# Patient Record
Sex: MALE | Race: WHITE | NOT HISPANIC OR LATINO | Employment: OTHER | URBAN - METROPOLITAN AREA
[De-identification: names, ages, dates, MRNs, and addresses within clinical notes are randomized per-mention and may not be internally consistent; named-entity substitution may affect disease eponyms.]

---

## 2017-04-19 ENCOUNTER — TELEPHONE (OUTPATIENT)
Dept: OTOLARYNGOLOGY | Facility: CLINIC | Age: 82
End: 2017-04-19

## 2017-04-19 NOTE — TELEPHONE ENCOUNTER
Left a message. Will give Marla this message so that she may call the patient to schedule Cochlear Implant eval.

## 2017-04-20 ENCOUNTER — TELEPHONE (OUTPATIENT)
Dept: OTOLARYNGOLOGY | Facility: CLINIC | Age: 82
End: 2017-04-20

## 2018-04-19 DIAGNOSIS — I71.40 ABDOMINAL AORTIC ANEURYSM (AAA) WITHOUT RUPTURE: Primary | ICD-10-CM

## 2018-04-24 ENCOUNTER — OFFICE VISIT (OUTPATIENT)
Dept: CARDIOLOGY | Facility: CLINIC | Age: 83
End: 2018-04-24
Payer: MEDICARE

## 2018-04-24 VITALS
HEART RATE: 79 BPM | WEIGHT: 167.13 LBS | OXYGEN SATURATION: 96 % | HEIGHT: 66 IN | DIASTOLIC BLOOD PRESSURE: 53 MMHG | BODY MASS INDEX: 26.86 KG/M2 | SYSTOLIC BLOOD PRESSURE: 112 MMHG

## 2018-04-24 DIAGNOSIS — I73.9 PAD (PERIPHERAL ARTERY DISEASE): ICD-10-CM

## 2018-04-24 DIAGNOSIS — I25.10 CORONARY ARTERY DISEASE, ANGINA PRESENCE UNSPECIFIED, UNSPECIFIED VESSEL OR LESION TYPE, UNSPECIFIED WHETHER NATIVE OR TRANSPLANTED HEART: ICD-10-CM

## 2018-04-24 DIAGNOSIS — E11.8 TYPE 2 DIABETES MELLITUS WITH COMPLICATION, WITHOUT LONG-TERM CURRENT USE OF INSULIN: ICD-10-CM

## 2018-04-24 DIAGNOSIS — I71.40 AAA (ABDOMINAL AORTIC ANEURYSM) WITHOUT RUPTURE: Primary | ICD-10-CM

## 2018-04-24 PROBLEM — E11.9 DM (DIABETES MELLITUS): Status: ACTIVE | Noted: 2018-04-24

## 2018-04-24 PROCEDURE — 99215 OFFICE O/P EST HI 40 MIN: CPT | Mod: S$GLB,,, | Performed by: INTERNAL MEDICINE

## 2018-04-24 PROCEDURE — 99999 PR PBB SHADOW E&M-EST. PATIENT-LVL IV: CPT | Mod: PBBFAC,,, | Performed by: INTERNAL MEDICINE

## 2018-04-24 RX ORDER — GABAPENTIN 100 MG/1
CAPSULE ORAL
COMMUNITY
Start: 2018-03-01 | End: 2018-06-26

## 2018-04-24 RX ORDER — DIPHENHYDRAMINE HCL 25 MG
50 CAPSULE ORAL ONCE
Status: CANCELLED | OUTPATIENT
Start: 2018-04-24 | End: 2018-04-24

## 2018-04-24 RX ORDER — SODIUM CHLORIDE 9 MG/ML
INJECTION, SOLUTION INTRAVENOUS CONTINUOUS
Status: CANCELLED | OUTPATIENT
Start: 2018-04-24

## 2018-04-24 RX ORDER — ROSUVASTATIN CALCIUM 40 MG/1
TABLET, COATED ORAL
COMMUNITY
Start: 2018-03-19

## 2018-04-24 RX ORDER — LOSARTAN POTASSIUM 25 MG/1
TABLET ORAL
COMMUNITY
Start: 2018-04-11

## 2018-04-24 RX ORDER — VITAMIN E MIXED 400 UNIT
CAPSULE ORAL
COMMUNITY
End: 2018-06-26

## 2018-04-24 RX ORDER — METOPROLOL TARTRATE 25 MG/1
TABLET, FILM COATED ORAL
COMMUNITY
End: 2018-06-26 | Stop reason: SDUPTHER

## 2018-04-24 RX ORDER — METOPROLOL SUCCINATE 25 MG/1
TABLET, EXTENDED RELEASE ORAL
COMMUNITY
Start: 2018-03-19

## 2018-04-24 RX ORDER — GLUCOSAM/CHONDRO/HERB 149/HYAL 750-100 MG
TABLET ORAL
COMMUNITY

## 2018-04-24 RX ORDER — TAMSULOSIN HYDROCHLORIDE 0.4 MG/1
CAPSULE ORAL
COMMUNITY
End: 2018-06-26 | Stop reason: SDUPTHER

## 2018-04-24 RX ORDER — NITROGLYCERIN 0.4 MG/1
TABLET SUBLINGUAL
COMMUNITY
Start: 2018-03-03

## 2018-04-24 RX ORDER — CITALOPRAM 40 MG/1
TABLET, FILM COATED ORAL
COMMUNITY
Start: 2018-03-01

## 2018-04-24 RX ORDER — LORAZEPAM 1 MG/1
TABLET ORAL
COMMUNITY
End: 2018-06-26

## 2018-04-24 RX ORDER — TAMSULOSIN HYDROCHLORIDE 0.4 MG/1
CAPSULE ORAL
COMMUNITY
Start: 2018-02-23

## 2018-04-24 RX ORDER — ACETAMINOPHEN 500 MG
TABLET ORAL
COMMUNITY

## 2018-04-24 RX ORDER — ASPIRIN 325 MG
TABLET ORAL
Status: ON HOLD | COMMUNITY
End: 2018-05-21 | Stop reason: HOSPADM

## 2018-04-24 NOTE — ASSESSMENT & PLAN NOTE
SASHA 6/2017 - R 0.53 and L 0.48, monophasic b/l  No CLI sxs. 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 No rest pain.

## 2018-04-24 NOTE — PROGRESS NOTES
"Subjective:    Patient ID:  Nas Valencia is a 87 y.o. male who presents for evaluation of Aortic Aneurysm      Referring Doctor: Dr. oJna PEDERSEN     This is an 88 y/o M w/ PMHx sig for [place], CAD s/p 5V CABG 2005, PAD and chronic infra-renal AAA s/p failed attempt at repair in EJ 2014 and DM. Referred to us for EVAR.    Pt has known of his AAA since 2005 and it has been monitored by his PCP. It was discovered during his CABG procedure.     AAA 4/2017 - 5.8 cm infra renal AAA  EVAR attempt at  in 05/2014 aborted due to severely calcified femoral arteries   AAA 01/31/2018 - 6.2 x 7.2 cm infra renal AAA  CTA Abd/Pelvis 2/6/2018 - 6.3 x 6.3 x 7.1 cm  SASHA 6/2017 - R 0.53 and L 0.48, monophasic b/l  TTE 5/3/2017 - EF 55-65%, mild LVH, DD1, mild LAE    Pt is compliant with his BP meds. He denies having any abdominal pain sxs. He denies any anginal sxs. No CLI sxs. 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 No rest pain. Pt has been a life long smoker and quit 17 yrs ago.    ROS   Constitution: negative for - fever, chills, weight loss or weight gain  HENT: negative for - sore throat, rhinorrhea, or headache  Eyes: negative for - blurred or double vision  Cardiovascular: see above  Pulmonary: see above  Gastrointestinal: negative for - abdominal pain, nausea, vomiting, or diarrhea  : negative for - dysuria  Neurological: negative for - focal weakness or sensory changes       Objective:    Physical Exam   BP (!) 112/53 (BP Location: Right arm, Patient Position: Sitting, BP Method: Large (Automatic))   Pulse 79   Ht 5' 6" (1.676 m)   Wt 75.8 kg (167 lb 1.7 oz)   SpO2 96%   BMI 26.97 kg/m²      Constitutional: No apparent distress, conversant  HEENT: Sclera anicteric, extraocular movements intact  Neck: No jugular venous distension, R Carotid Bruit  Cardiac: Regular rate and rhythm, no JVD,  no murmurs rubs or gallops, normal S1/S2, no LE edema  Pulm: Clear to auscultation " bilaterally, no wheezes, rales, or ronchi  GI: Abdomen soft, nontender, nondistended  Skin: No ecchymosis, erythema, or ulcers  Psych: Alert and oriented to person place location, appropriate affect  Neuro: No focal deficits  Vascular:   RIGHT LEFT   RADIAL 2+ 2+   ULNAR 2+ 2+   FEMORAL 1+ 1+   DP Non-palpable Non-palpable   TP Non-palpable Non-palpable   TWYLA'S TEST Normal Normal   BARBEAU TEST           OUTSIDE STUDIES:    US AAA 4/2017 - 5.8 cm infra renal AAA    EVAR attempt at  in 05/2014 aborted due to severely calcified femoral arteries     AAA 01/31/2018 - 6.2 x 7.2 cm infra renal AAA    CTA Abd/Pelvis - 6.3 x 6.3 x 7.1 cm    SASHA 6/2017 - R 0.53 and L 0.48, monophasic b/l    TTE 5/3/2017 - EF 55-65%, mild LVH, DD1, mild LAE      Assessment:       1. AAA (abdominal aortic aneurysm) without rupture    2. Coronary artery disease, angina presence unspecified, unspecified vessel or lesion type, unspecified whether native or transplanted heart    3. PAD (peripheral artery disease)    4. Type 2 diabetes mellitus with complication, without long-term current use of insulin         Plan:           AAA (abdominal aortic aneurysm) without rupture  CTA Abd/Pelvis 2/6/2018 - Infra-renal AAA measuring 6.3 x 6.3 x 7.1 cm. Meets indication for repair  Will send his CTA to [facility] for gating and graft assessment. If they approve, then we will first proceed with PTA of his Iliac and femoral arterial system in prep for EVAR (his ilio-fem system has multiple discrete calcified stenoses which currently preclude endograft deployment). If we are able to adequately dilate, will then proceed with EVAR in a staged fashion    CAD (coronary artery disease)  CAD s/p 5V CABG 2005  Angina free    PAD (peripheral artery disease)  SASHA 6/2017 - R 0.53 and L 0.48, monophasic b/l  No CLI sxs. 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 No rest pain.    DM (diabetes mellitus)  Diet controlled    Above plan d/w Dr. Hernandez  who is in agreement.    Please page/call if any questions or concerns.     Pavan Alcazar MD  Interventional Cardiology Fellow  Pager: [phone]      I have personally taken the history and examined this patient. I have discussed and agree with the resident's findings and plan as documented in the resident's note.  Will evaluate patient for Ovation Graft. Known severe bilateral iliac disease would require prior angioplasty. If not candidate for PEVAR, will refer to vascular surgery.  Dave Hernandez

## 2018-04-24 NOTE — ASSESSMENT & PLAN NOTE
CTA Abd/Pelvis 2/6/2018 - Infra-renal AAA measuring 6.3 x 6.3 x 7.1 cm. Meets indication for repair  Will send his CTA to [facility] for gating and graft assessment. If they approve, then we will first proceed with PTA of his Iliac and femoral arterial system in prep for EVAR (his ilio-fem system has multiple discrete calcified stenoses which currently preclude endograft deployment). If we are able to adequately dilate, will then proceed with EVAR in a staged fashion

## 2018-04-24 NOTE — LETTER
April 24, 2018      Jona Carlos MD  [address]  [address]  [place] [address]           [facility]  [address]  [facility] [address]  Phone: [phone]          Patient: Nas Valencia   MR Number: [id number]   YOB: 1930   Date of Visit: 4/24/2018       Dear Dr. Jona Carlos:    Thank you for referring Nas Valencia to me for evaluation. Attached you will find relevant portions of my assessment and plan of care.    If you have questions, please do not hesitate to call me. I look forward to following Nas Valencia along with you.    Sincerely,    Dave Hernandez MD    Enclosure  CC:  No Recipients    If you would like to receive this communication electronically, please contact [email or web address] or [phone] to request more information on [employer] access.    For providers and/or their staff who would like to refer a patient to [facility], please contact us through our one-stop-shop provider referral line, [facility], at [phone].    If you feel you have received this communication in error or would no longer like to receive these types of communications, please e-mail [email or web address]

## 2018-05-14 ENCOUNTER — DOCUMENTATION ONLY (OUTPATIENT)
Dept: CARDIOLOGY | Facility: CLINIC | Age: 83
End: 2018-05-14

## 2018-05-14 NOTE — PROGRESS NOTES
OUTPATIENT CATHETERIZATION INSTRUCTIONS    You have been scheduled for a procedure in the catheterization lab on Monday, May 21,  2018.     Please report to the Cardiology Waiting Area on the Third floor of the hospital and check in at 10 AM.   You will then be taken to the SSCU (Short Stay Cardiac Unit) and prepared for your procedure. Please be aware that this is not the time of your procedure but the time you are to arrive. The procedures are scheduled on an hourly basis; however, emergency cases take precedence over all other cases.       You may not have anything to eat or drink after midnight the night before your test. You may take your regular morning medications with water. If there are any medications that you should not take you will be instructed to hold them that morning. If you are diabetic and on Metformin (Glucophage) do not take it the day before, the day of, and for 2 days after your procedure.      The procedure will take 1-2 hours to perform. After the procedure, you will return to SSCU on the third floor of the hospital. You will need to lie still (or keep your arm still) for the next 4 to 6 hours to minimize bleeding from the puncture site. Your family may remain in the room with you during this time.       You may be able to be discharged home that same afternoon if there is someone to drive you home and there were no complications. If you have one of the balloon, stent, or device procedures you may spend the night in the hospital. Your doctor will determine, based on your progress, the date and time of your discharge. The results of your procedure will be discussed with you before you are discharged. Any further testing or procedures will be scheduled for you either before you leave or you will be called with these appointments.       If you should have any questions, concerns, or need to change the date of your procedure, please call  JAXSON Doty @ [phone]    Special  Instructions:  none        THE ABOVE INSTRUCTIONS WERE GIVEN TO THE PATIENT VERBALLY AND THEY VERBALIZED UNDERSTANDING.  THEY DO NOT REQUIRE ANY SPECIAL NEEDS AND DO NOT HAVE ANY LEARNING BARRIERS.          Directions for Reporting to Cardiology Waiting Area in the Hospital  If you park in the Parking Garage:  Take elevators to the1st floor of the parking garage.  Continue past the gift shop, coffee shop, and piano.  Take a right and go to the gold elevators. (Elevator B)  Take the elevator to the 3rd floor.  Follow the arrow on the sign on the wall that says Cath Lab Registration/EP Lab Registration.  Follow the long hallway all the way around until you come to a big open area.  This is the registration area.  Check in at Reception Desk.    OR    If family is dropping you off:  Have them drop you off at the front of the Hospital under the green overhang.  Enter through the doors and take a right.  Take the E elevators to the 3rd floor Cardiology Waiting Area.  Check in at the Reception Desk in the waiting room.

## 2018-05-21 ENCOUNTER — HOSPITAL ENCOUNTER (INPATIENT)
Facility: HOSPITAL | Age: 83
LOS: 1 days | Discharge: HOME | DRG: 269 | End: 2018-05-22
Attending: INTERNAL MEDICINE | Admitting: INTERNAL MEDICINE
Payer: MEDICARE

## 2018-05-21 DIAGNOSIS — I71.40 AAA (ABDOMINAL AORTIC ANEURYSM) WITHOUT RUPTURE: ICD-10-CM

## 2018-05-21 DIAGNOSIS — I25.10 ATHEROSCLEROTIC HEART DISEASE OF NATIVE CORONARY ARTERY WITHOUT ANGINA PECTORIS: ICD-10-CM

## 2018-05-21 LAB
ABO + RH BLD: NORMAL
ANION GAP SERPL CALC-SCNC: 8 MMOL/L
BASOPHILS # BLD AUTO: 0.02 K/UL
BASOPHILS NFR BLD: 0.4 %
BLD GP AB SCN CELLS X3 SERPL QL: NORMAL
BUN SERPL-MCNC: 18 MG/DL
CALCIUM SERPL-MCNC: 10.4 MG/DL
CHLORIDE SERPL-SCNC: 101 MMOL/L
CO2 SERPL-SCNC: 27 MMOL/L
CREAT SERPL-MCNC: 1 MG/DL
DIFFERENTIAL METHOD: ABNORMAL
EOSINOPHIL # BLD AUTO: 0.2 K/UL
EOSINOPHIL NFR BLD: 4.6 %
ERYTHROCYTE [DISTWIDTH] IN BLOOD BY AUTOMATED COUNT: 13.7 %
EST. GFR  (AFRICAN AMERICAN): >60 ML/MIN/1.73 M^2
EST. GFR  (NON AFRICAN AMERICAN): >60 ML/MIN/1.73 M^2
GLUCOSE SERPL-MCNC: 115 MG/DL
HCT VFR BLD AUTO: 36.7 %
HGB BLD-MCNC: 12.2 G/DL
IMM GRANULOCYTES # BLD AUTO: 0.01 K/UL
IMM GRANULOCYTES NFR BLD AUTO: 0.2 %
LYMPHOCYTES # BLD AUTO: 1.3 K/UL
LYMPHOCYTES NFR BLD: 25.2 %
MCH RBC QN AUTO: 31.1 PG
MCHC RBC AUTO-ENTMCNC: 33.2 G/DL
MCV RBC AUTO: 94 FL
MONOCYTES # BLD AUTO: 0.6 K/UL
MONOCYTES NFR BLD: 11.5 %
NEUTROPHILS # BLD AUTO: 3 K/UL
NEUTROPHILS NFR BLD: 58.1 %
NRBC BLD-RTO: 0 /100 WBC
PERIPHERAL PTA: YES
PERIPHERAL STENOSIS: ABNORMAL
PERIPHERAL STENT: YES
PLATELET # BLD AUTO: 144 K/UL
PMV BLD AUTO: 10.8 FL
POC ACTIVATED CLOTTING TIME K: 224 SEC (ref 74–137)
POCT GLUCOSE: 109 MG/DL (ref 70–110)
POTASSIUM SERPL-SCNC: 4.2 MMOL/L
RBC # BLD AUTO: 3.92 M/UL
SAMPLE: ABNORMAL
SODIUM SERPL-SCNC: 136 MMOL/L
WBC # BLD AUTO: 5.23 K/UL

## 2018-05-21 PROCEDURE — 93005 ELECTROCARDIOGRAM TRACING: CPT

## 2018-05-21 PROCEDURE — 99152 MOD SED SAME PHYS/QHP 5/>YRS: CPT | Mod: ,,, | Performed by: INTERNAL MEDICINE

## 2018-05-21 PROCEDURE — 11000001 HC ACUTE MED/SURG PRIVATE ROOM

## 2018-05-21 PROCEDURE — 36415 COLL VENOUS BLD VENIPUNCTURE: CPT

## 2018-05-21 PROCEDURE — 25000003 PHARM REV CODE 250: Performed by: INTERNAL MEDICINE

## 2018-05-21 PROCEDURE — 80048 BASIC METABOLIC PNL TOTAL CA: CPT

## 2018-05-21 PROCEDURE — 27801443 CATH LAB PROCEDURE

## 2018-05-21 PROCEDURE — 25000003 PHARM REV CODE 250

## 2018-05-21 PROCEDURE — 63600175 PHARM REV CODE 636 W HCPCS

## 2018-05-21 PROCEDURE — 037N3DZ DILATION OF LEFT EXTERNAL CAROTID ARTERY WITH INTRALUMINAL DEVICE, PERCUTANEOUS APPROACH: ICD-10-PCS | Performed by: INTERNAL MEDICINE

## 2018-05-21 PROCEDURE — 36200 PLACE CATHETER IN AORTA: CPT | Mod: 59,,, | Performed by: INTERNAL MEDICINE

## 2018-05-21 PROCEDURE — 037M3DZ DILATION OF RIGHT EXTERNAL CAROTID ARTERY WITH INTRALUMINAL DEVICE, PERCUTANEOUS APPROACH: ICD-10-PCS | Performed by: INTERNAL MEDICINE

## 2018-05-21 PROCEDURE — 93010 ELECTROCARDIOGRAM REPORT: CPT | Mod: ,,, | Performed by: INTERNAL MEDICINE

## 2018-05-21 PROCEDURE — 037H3DZ DILATION OF RIGHT COMMON CAROTID ARTERY WITH INTRALUMINAL DEVICE, PERCUTANEOUS APPROACH: ICD-10-PCS | Performed by: INTERNAL MEDICINE

## 2018-05-21 PROCEDURE — 86901 BLOOD TYPING SEROLOGIC RH(D): CPT

## 2018-05-21 PROCEDURE — 82962 GLUCOSE BLOOD TEST: CPT

## 2018-05-21 PROCEDURE — G0269 OCCLUSIVE DEVICE IN VEIN ART: HCPCS

## 2018-05-21 PROCEDURE — 34705 EVAC RPR A-BIILIAC NDGFT: CPT | Mod: ,,, | Performed by: INTERNAL MEDICINE

## 2018-05-21 PROCEDURE — 85025 COMPLETE CBC W/AUTO DIFF WBC: CPT

## 2018-05-21 PROCEDURE — 04R04JZ REPLACEMENT OF ABDOMINAL AORTA WITH SYNTHETIC SUBSTITUTE, PERCUTANEOUS ENDOSCOPIC APPROACH: ICD-10-PCS | Performed by: INTERNAL MEDICINE

## 2018-05-21 PROCEDURE — 037J3DZ DILATION OF LEFT COMMON CAROTID ARTERY WITH INTRALUMINAL DEVICE, PERCUTANEOUS APPROACH: ICD-10-PCS | Performed by: INTERNAL MEDICINE

## 2018-05-21 RX ORDER — ACETAMINOPHEN 325 MG/1
650 TABLET ORAL EVERY 4 HOURS PRN
Status: DISCONTINUED | OUTPATIENT
Start: 2018-05-21 | End: 2018-05-22 | Stop reason: HOSPADM

## 2018-05-21 RX ORDER — ASPIRIN 81 MG/1
81 TABLET ORAL DAILY
Status: DISCONTINUED | OUTPATIENT
Start: 2018-05-22 | End: 2018-05-22 | Stop reason: HOSPADM

## 2018-05-21 RX ORDER — SODIUM CHLORIDE 9 MG/ML
INJECTION, SOLUTION INTRAVENOUS CONTINUOUS
Status: DISCONTINUED | OUTPATIENT
Start: 2018-05-21 | End: 2018-05-22 | Stop reason: HOSPADM

## 2018-05-21 RX ORDER — TAMSULOSIN HYDROCHLORIDE 0.4 MG/1
0.4 CAPSULE ORAL DAILY
Status: DISCONTINUED | OUTPATIENT
Start: 2018-05-22 | End: 2018-05-22 | Stop reason: HOSPADM

## 2018-05-21 RX ORDER — DIPHENHYDRAMINE HCL 50 MG
50 CAPSULE ORAL ONCE
Status: COMPLETED | OUTPATIENT
Start: 2018-05-21 | End: 2018-05-21

## 2018-05-21 RX ORDER — CITALOPRAM 20 MG/1
20 TABLET, FILM COATED ORAL DAILY
Status: DISCONTINUED | OUTPATIENT
Start: 2018-05-22 | End: 2018-05-22 | Stop reason: HOSPADM

## 2018-05-21 RX ORDER — LOSARTAN POTASSIUM 25 MG/1
25 TABLET ORAL DAILY
Status: DISCONTINUED | OUTPATIENT
Start: 2018-05-22 | End: 2018-05-22 | Stop reason: HOSPADM

## 2018-05-21 RX ORDER — ROSUVASTATIN CALCIUM 20 MG/1
40 TABLET, COATED ORAL NIGHTLY
Status: DISCONTINUED | OUTPATIENT
Start: 2018-05-21 | End: 2018-05-22 | Stop reason: HOSPADM

## 2018-05-21 RX ORDER — CLOPIDOGREL BISULFATE 75 MG/1
300 TABLET ORAL ONCE
Status: COMPLETED | OUTPATIENT
Start: 2018-05-21 | End: 2018-05-21

## 2018-05-21 RX ORDER — CLOPIDOGREL BISULFATE 75 MG/1
75 TABLET ORAL DAILY
Status: DISCONTINUED | OUTPATIENT
Start: 2018-05-22 | End: 2018-05-22 | Stop reason: HOSPADM

## 2018-05-21 RX ORDER — METOPROLOL SUCCINATE 25 MG/1
25 TABLET, EXTENDED RELEASE ORAL DAILY
Status: DISCONTINUED | OUTPATIENT
Start: 2018-05-22 | End: 2018-05-22 | Stop reason: HOSPADM

## 2018-05-21 RX ORDER — GABAPENTIN 100 MG/1
100 CAPSULE ORAL NIGHTLY
Status: DISCONTINUED | OUTPATIENT
Start: 2018-05-21 | End: 2018-05-22 | Stop reason: HOSPADM

## 2018-05-21 RX ORDER — ASPIRIN 81 MG/1
81 TABLET ORAL DAILY
Refills: 0 | COMMUNITY
Start: 2018-05-22 | End: 2019-10-04

## 2018-05-21 RX ORDER — CLOPIDOGREL BISULFATE 75 MG/1
75 TABLET ORAL DAILY
Qty: 30 TABLET | Refills: 11 | Status: SHIPPED | OUTPATIENT
Start: 2018-05-22 | End: 2019-05-22

## 2018-05-21 RX ADMIN — GABAPENTIN 100 MG: 100 CAPSULE ORAL at 08:05

## 2018-05-21 RX ADMIN — DIPHENHYDRAMINE HYDROCHLORIDE 50 MG: 50 CAPSULE ORAL at 10:05

## 2018-05-21 RX ADMIN — CLOPIDOGREL 300 MG: 75 TABLET, FILM COATED ORAL at 05:05

## 2018-05-21 RX ADMIN — ROSUVASTATIN CALCIUM 40 MG: 20 TABLET, FILM COATED ORAL at 08:05

## 2018-05-21 RX ADMIN — SODIUM CHLORIDE: 0.9 INJECTION, SOLUTION INTRAVENOUS at 10:05

## 2018-05-21 RX ADMIN — ACETAMINOPHEN 650 MG: 325 TABLET ORAL at 08:05

## 2018-05-21 NOTE — H&P (VIEW-ONLY)
"Subjective:    Patient ID:  Nas Valencia is a 87 y.o. male who presents for evaluation of Aortic Aneurysm      Referring Doctor: Dr. Jona PEDERSEN     This is an 86 y/o M w/ PMHx sig for [place], CAD s/p 5V CABG 2005, PAD and chronic infra-renal AAA s/p failed attempt at repair in EJ 2014 and DM. Referred to us for EVAR.    Pt has known of his AAA since 2005 and it has been monitored by his PCP. It was discovered during his CABG procedure.     AAA 4/2017 - 5.8 cm infra renal AAA  EVAR attempt at  in 05/2014 aborted due to severely calcified femoral arteries   AAA 01/31/2018 - 6.2 x 7.2 cm infra renal AAA  CTA Abd/Pelvis 2/6/2018 - 6.3 x 6.3 x 7.1 cm  SAHSA 6/2017 - R 0.53 and L 0.48, monophasic b/l  TTE 5/3/2017 - EF 55-65%, mild LVH, DD1, mild LAE    Pt is compliant with his BP meds. He denies having any abdominal pain sxs. He denies any anginal sxs. No CLI sxs. 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 No rest pain. Pt has been a life long smoker and quit 17 yrs ago.    ROS   Constitution: negative for - fever, chills, weight loss or weight gain  HENT: negative for - sore throat, rhinorrhea, or headache  Eyes: negative for - blurred or double vision  Cardiovascular: see above  Pulmonary: see above  Gastrointestinal: negative for - abdominal pain, nausea, vomiting, or diarrhea  : negative for - dysuria  Neurological: negative for - focal weakness or sensory changes       Objective:    Physical Exam   BP (!) 112/53 (BP Location: Right arm, Patient Position: Sitting, BP Method: Large (Automatic))   Pulse 79   Ht 5' 6" (1.676 m)   Wt 75.8 kg (167 lb 1.7 oz)   SpO2 96%   BMI 26.97 kg/m²      Constitutional: No apparent distress, conversant  HEENT: Sclera anicteric, extraocular movements intact  Neck: No jugular venous distension, R Carotid Bruit  Cardiac: Regular rate and rhythm, no JVD,  no murmurs rubs or gallops, normal S1/S2, no LE edema  Pulm: Clear to auscultation " bilaterally, no wheezes, rales, or ronchi  GI: Abdomen soft, nontender, nondistended  Skin: No ecchymosis, erythema, or ulcers  Psych: Alert and oriented to person place location, appropriate affect  Neuro: No focal deficits  Vascular:   RIGHT LEFT   RADIAL 2+ 2+   ULNAR 2+ 2+   FEMORAL 1+ 1+   DP Non-palpable Non-palpable   TP Non-palpable Non-palpable   TWYLA'S TEST Normal Normal   BARBEAU TEST           OUTSIDE STUDIES:    US AAA 4/2017 - 5.8 cm infra renal AAA    EVAR attempt at  in 05/2014 aborted due to severely calcified femoral arteries     AAA 01/31/2018 - 6.2 x 7.2 cm infra renal AAA    CTA Abd/Pelvis - 6.3 x 6.3 x 7.1 cm    SASHA 6/2017 - R 0.53 and L 0.48, monophasic b/l    TTE 5/3/2017 - EF 55-65%, mild LVH, DD1, mild LAE      Assessment:       1. AAA (abdominal aortic aneurysm) without rupture    2. Coronary artery disease, angina presence unspecified, unspecified vessel or lesion type, unspecified whether native or transplanted heart    3. PAD (peripheral artery disease)    4. Type 2 diabetes mellitus with complication, without long-term current use of insulin         Plan:           AAA (abdominal aortic aneurysm) without rupture  CTA Abd/Pelvis 2/6/2018 - Infra-renal AAA measuring 6.3 x 6.3 x 7.1 cm. Meets indication for repair  Will send his CTA to [facility] for gating and graft assessment. If they approve, then we will first proceed with PTA of his Iliac and femoral arterial system in prep for EVAR (his ilio-fem system has multiple discrete calcified stenoses which currently preclude endograft deployment). If we are able to adequately dilate, will then proceed with EVAR in a staged fashion    CAD (coronary artery disease)  CAD s/p 5V CABG 2005  Angina free    PAD (peripheral artery disease)  SASHA 6/2017 - R 0.53 and L 0.48, monophasic b/l  No CLI sxs. 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 No rest pain.    DM (diabetes mellitus)  Diet controlled    Above plan d/w Dr. Hernandez  who is in agreement.    Please page/call if any questions or concerns.     Pavan Alcazar MD  Interventional Cardiology Fellow  Pager: [phone]      I have personally taken the history and examined this patient. I have discussed and agree with the resident's findings and plan as documented in the resident's note.  Will evaluate patient for Ovation Graft. Known severe bilateral iliac disease would require prior angioplasty. If not candidate for PEVAR, will refer to vascular surgery.  Dave eHrnandez

## 2018-05-21 NOTE — BRIEF OP NOTE
"    Post Cath Note  Referring Physician: Dave Hernandez MD  Procedure: REPAIR-ANEURYSM-PERCUTANEOUS ENDOVASCULAR (N/A)       Access: Right CFA, Left CFA    Infrarenal AAA with B/L iliac diease    See full report for further details    Intervention:     PTA to B/L Common and External Iliacs with 5 x 100 mm Balloon    23 mm Ovation iX AAA graft  12 x 140 mm Ovation iX L limb  14 x 120 mm Ovation iX R limb    Closure device: Perclosure    Post Cath Exam:   BP (!) 147/87   Pulse 61   Temp 98.4 °F (36.9 °C) (Oral)   Resp 18   Ht 5' 6" (1.676 m)   Wt 72.6 kg (160 lb)   SpO2 99%   BMI 25.82 kg/m²   No unusual pain, hematoma, thrill or bruit at vascular access site.    Post-procedure pulses:  Triphasic Doppler of R DP  No Doppler of R PT  Biphasic Doppler of L DP  Biphasic Doppler of L PT    Recommendations:   - Routine post-cath care  - IVF at 3 cc/kg/hr for 4 hrs  - Continue medical management  - Load Plavix 300 mg now, then Plavix 75 mg Daily for at least 6 weeks and ASA 81 mg indefinitely  - Follow up in 1 month with abdominal ultrasound  - Follow up in 6 months with CTA Abdomen/Pelvis  - Follow-up with outpatient cardiologist (Dr. Carlos)    Signed:  Douglas Garcia MD  Cardiology Fellow, PGY-5  5/21/2018 3:42 PM  "

## 2018-05-22 VITALS
WEIGHT: 160 LBS | HEART RATE: 65 BPM | OXYGEN SATURATION: 98 % | RESPIRATION RATE: 18 BRPM | BODY MASS INDEX: 25.71 KG/M2 | DIASTOLIC BLOOD PRESSURE: 73 MMHG | TEMPERATURE: 98 F | HEIGHT: 66 IN | SYSTOLIC BLOOD PRESSURE: 171 MMHG

## 2018-05-22 PROCEDURE — 25000003 PHARM REV CODE 250: Performed by: INTERNAL MEDICINE

## 2018-05-22 RX ADMIN — CITALOPRAM HYDROBROMIDE 20 MG: 20 TABLET ORAL at 08:05

## 2018-05-22 RX ADMIN — CLOPIDOGREL 75 MG: 75 TABLET, FILM COATED ORAL at 08:05

## 2018-05-22 RX ADMIN — ASPIRIN 81 MG: 81 TABLET, COATED ORAL at 08:05

## 2018-05-22 RX ADMIN — LOSARTAN POTASSIUM 25 MG: 25 TABLET, FILM COATED ORAL at 08:05

## 2018-05-22 RX ADMIN — TAMSULOSIN HYDROCHLORIDE 0.4 MG: 0.4 CAPSULE ORAL at 08:05

## 2018-05-22 RX ADMIN — ACETAMINOPHEN 650 MG: 325 TABLET ORAL at 07:05

## 2018-05-22 RX ADMIN — ACETAMINOPHEN 650 MG: 325 TABLET ORAL at 03:05

## 2018-05-22 RX ADMIN — METOPROLOL SUCCINATE 25 MG: 25 TABLET, EXTENDED RELEASE ORAL at 08:05

## 2018-05-22 NOTE — DISCHARGE SUMMARY
Discharge Summary  Interventional Cardiology      Admit Date: 5/21/2018    Discharge Date:  5/22/2018    Attending Physician: Isabel att. providers found    Discharge Physician: Douglas Garcia MD    Principal Diagnoses: AAA (abdominal aortic aneurysm) without rupture  Indication for Admission: REPAIR-ANEURYSM-PERCUTANEOUS ENDOVASCULAR (N/A)    Discharged Condition: Good    Hospital Course:   Patient presented for outpatient AAA and iliac disease repair which went without complication. Patient underwent PTA to B/L Common and External Iliacs with 5 x 100 mm Balloons, had a 23 mm Ovation iX AAA graft placed with a 12 x 140 mm Ovation iX L limb and 14 x 120 mm Ovation iX R limb. See full cath report in EPIC for details. Hemostasis of B/L CFA access sites was obtained with Perclose devices. Patient was monitored overnight without issue. This morning his groin access sites were c/d/i, no hematoma. He was able to ambulate without difficulty. He was feeling well and anticipating discharge home today.    Outpatient Plan:  - Continue medical management  - Plavix 75 mg Daily for at least 6 weeks and ASA 81 mg indefinitely  - Follow up in 1 month with abdominal ultrasound  - Follow up in 6 months with CTA Abdomen/Pelvis  - Follow-up with outpatient cardiologist (Dr. Carlos)    Diet: Cardiac diet    Activity: Ad victoriano, wound care instructions provided    Disposition: Home or Self Care    Discharge Medications:      Medication List      START taking these medications    aspirin 81 MG EC tablet  Commonly known as:  ECOTRIN  Take 1 tablet (81 mg total) by mouth once daily.  Replaces:  aspirin 325 MG tablet     clopidogrel 75 mg tablet  Commonly known as:  PLAVIX  Take 1 tablet (75 mg total) by mouth once daily.        CONTINUE taking these medications    ACETAMINOPHEN EXTRA STRENGTH 500 MG tablet  Generic drug:  acetaminophen     B COMPLEX 1 ORAL     citalopram 40 MG tablet  Commonly known as:  CELEXA     coenzyme Q10-vitamin E 100  mg- 10 unit Cap     E-200 200 unit Cap  Generic drug:  vitamin E (dl, acetate)     FISH OIL 1,000 mg (120 mg-180 mg) Cap  Generic drug:  omega 3-dha-epa-fish oil     gabapentin 100 MG capsule  Commonly known as:  NEURONTIN     LORazepam 1 MG tablet  Commonly known as:  ATIVAN     losartan 25 MG tablet  Commonly known as:  COZAAR     metoprolol succinate 25 MG 24 hr tablet  Commonly known as:  TOPROL-XL     metoprolol tartrate 25 MG tablet  Commonly known as:  LOPRESSOR     multivitamin with minerals tablet     nitroGLYCERIN 0.4 MG SL tablet  Commonly known as:  NITROSTAT     ranitidine 150 MG capsule  Commonly known as:  ZANTAC     rosuvastatin 40 MG Tab  Commonly known as:  CRESTOR     * tamsulosin 0.4 mg Cp24  Commonly known as:  FLOMAX     * tamsulosin 0.4 mg Cp24  Commonly known as:  FLOMAX        * This list has 2 medication(s) that are the same as other medications prescribed for you. Read the directions carefully, and ask your doctor or other care provider to review them with you.            STOP taking these medications    aspirin 325 MG tablet  Replaced by:  aspirin 81 MG EC tablet           Where to Get Your Medications      These medications were sent to [facility] - [place], [place] - [address]  [address], [facility] [address]    Phone:  [phone]   · clopidogrel 75 mg tablet     You can get these medications from any pharmacy    You don't need a prescription for these medications  · aspirin 81 MG EC tablet       Follow Up:  Follow-up Information     Dave Hernandez MD.    Specialties:  Cardiology, INTERVENTIONAL CARDIOLOGY  Contact information:  [facility] [facility] [address]  [phone]             Jona Carlos MD.    Specialty:  Cardiology  Contact information:  [address] [place] [address]  [address]  [facility] [id number] [phone]

## 2018-05-22 NOTE — NURSING
Pt d/c'd to home per md orders.  Vss.  daniel groin sites remains charles.  0 bleed, 0 hematoma.  Instructed pt on home medications, post procedure precautions and follow up visits.  Pt verbalizes understanding.  Pt in no acute distress and verbalizes no complaints.  piv removed intact and without difficulty.  Telemetry monitor removed.

## 2018-05-23 DIAGNOSIS — I71.40 ABDOMINAL AORTIC ANEURYSM (AAA) WITHOUT RUPTURE: Primary | ICD-10-CM

## 2018-05-24 ENCOUNTER — NURSE TRIAGE (OUTPATIENT)
Dept: ADMINISTRATIVE | Facility: CLINIC | Age: 83
End: 2018-05-24

## 2018-05-24 NOTE — TELEPHONE ENCOUNTER
Reason for Disposition   Caller has NON-URGENT question and triager unable to answer question    Protocols used: ST POST-OP SYMPTOMS AND [id number]    Pt calling regarding temperature reading of 99.4.  Pt states his usual temperature is 97.6.  Aneurysm repair on 5/21/18.  Care advice given.    It is not unusual to have low grade fevers in first 3 days post foreign body implant. Take tylenol or motrin. Any persistent fever > 5 days post procedure warrant investigation.   Dave Hernandez MD (Routing comment)         0748: Contacted Pt regarding MD message.  Pt verbalized understanding.

## 2018-06-26 ENCOUNTER — CLINICAL SUPPORT (OUTPATIENT)
Dept: CARDIOLOGY | Facility: CLINIC | Age: 83
End: 2018-06-26
Attending: INTERNAL MEDICINE
Payer: MEDICARE

## 2018-06-26 ENCOUNTER — OFFICE VISIT (OUTPATIENT)
Dept: CARDIOLOGY | Facility: CLINIC | Age: 83
End: 2018-06-26
Payer: MEDICARE

## 2018-06-26 VITALS
SYSTOLIC BLOOD PRESSURE: 149 MMHG | OXYGEN SATURATION: 97 % | HEART RATE: 52 BPM | HEIGHT: 66 IN | BODY MASS INDEX: 26.01 KG/M2 | DIASTOLIC BLOOD PRESSURE: 67 MMHG | WEIGHT: 161.81 LBS

## 2018-06-26 DIAGNOSIS — I71.40 AAA (ABDOMINAL AORTIC ANEURYSM) WITHOUT RUPTURE: ICD-10-CM

## 2018-06-26 DIAGNOSIS — I71.40 ABDOMINAL AORTIC ANEURYSM (AAA) WITHOUT RUPTURE: ICD-10-CM

## 2018-06-26 DIAGNOSIS — I71.40 AAA (ABDOMINAL AORTIC ANEURYSM) WITHOUT RUPTURE: Primary | ICD-10-CM

## 2018-06-26 DIAGNOSIS — E11.8 TYPE 2 DIABETES MELLITUS WITH COMPLICATION, WITHOUT LONG-TERM CURRENT USE OF INSULIN: ICD-10-CM

## 2018-06-26 DIAGNOSIS — I71.40 ABDOMINAL AORTIC ANEURYSM (AAA) WITHOUT RUPTURE: Primary | ICD-10-CM

## 2018-06-26 DIAGNOSIS — I25.10 CORONARY ARTERY DISEASE, ANGINA PRESENCE UNSPECIFIED, UNSPECIFIED VESSEL OR LESION TYPE, UNSPECIFIED WHETHER NATIVE OR TRANSPLANTED HEART: ICD-10-CM

## 2018-06-26 DIAGNOSIS — I73.9 PAD (PERIPHERAL ARTERY DISEASE): ICD-10-CM

## 2018-06-26 LAB — VASCULAR ABDOMINAL AORTIC ANEURYSM (AAA): 2.01

## 2018-06-26 PROCEDURE — 99999 PR PBB SHADOW E&M-EST. PATIENT-LVL III: CPT | Mod: PBBFAC,,, | Performed by: INTERNAL MEDICINE

## 2018-06-26 PROCEDURE — 99214 OFFICE O/P EST MOD 30 MIN: CPT | Mod: S$GLB,,, | Performed by: INTERNAL MEDICINE

## 2018-06-26 PROCEDURE — 93978 VASCULAR STUDY: CPT | Mod: S$GLB,,, | Performed by: INTERNAL MEDICINE

## 2018-06-26 NOTE — ASSESSMENT & PLAN NOTE
-s/p repair with 23 mm ovation IX   -Poor quality U/S abdomen AAA today-repeat U/S abdomen in 1 week  -follow up in 3 months with repeat U/S abdomen AAA after above(regular follow up)

## 2018-06-26 NOTE — ASSESSMENT & PLAN NOTE
-Complains of claudication left >right now René type II b  -U/s b/l lower extremity arterial in 1 week

## 2018-06-26 NOTE — PROGRESS NOTES
Interventional Cardiology Clinic Note  Reason for Visit: Aortic Aneurysm  Referring doctor Jona Carlos  HPI:   Mr. Nas Valencia is a 87 y.o. gentleman who is hard of hearing,H/o CAD s/p 5V CABG 2005, PAD and chronic infra-renal AAA s/p failed attempt at repair in EJ 2014 and DM. Referred to us for EVAR and first seen in clinic 4/24/2018.    Pt has known of his AAA since 2005 and it has been monitored by his PCP. It was discovered during his CABG procedure.     US AAA 4/2017 - 5.8 cm infra renal AAA  EVAR attempt at  in 05/2014 aborted due to severely calcified femoral arteries  US AAA 01/31/2018 - 6.2 x 7.2 cm infra renal AAA  CTA Abd/Pelvis 2/6/2018 - 6.3 x 6.3 x 7.1 cm  SASHA 6/2017 - R 0.53 and L 0.48, monophasic b/l  TTE 5/3/2017 - EF 55-65%, mild LVH, DD1, mild LAE     Pt is compliant with his BP meds. He denies having any abdominal pain sxs. He denies any anginal sxs. No CLI sxs. Pt use to develop claudication R>L after 3 blocks (René 2a). No rest pain. Pt has been a life long smoker and quit 17 yrs ago.    He underwent AAA repair 5/21/2018 with 23 mm ovation IX and b/l external iliac PTA with 5 x 10 balloon. He is here for his first followup.U/S AAA done today poor quality with lot of air. He also reports worse claudication in LLE at 1.5 blocks compared to previously.No0 abdomen pain or back pain.    ROS:    Review of Systems   All other systems reviewed and are negative.    PMH:     Past Medical History:   Diagnosis Date    Diabetes mellitus      History reviewed. No pertinent surgical history.  Allergies:   Review of patient's allergies indicates:  No Known Allergies  Medications:     Current Outpatient Prescriptions on File Prior to Visit   Medication Sig Dispense Refill    aspirin (ECOTRIN) 81 MG EC tablet Take 1 tablet (81 mg total) by mouth once daily.  0    citalopram (CELEXA) 40 MG tablet       clopidogrel (PLAVIX) 75 mg tablet Take 1 tablet (75 mg total) by mouth once  "daily. 30 tablet 11    losartan (COZAAR) 25 MG tablet       metoprolol succinate (TOPROL-XL) 25 MG 24 hr tablet       multivitamin with minerals tablet       omega 3-dha-epa-fish oil (FISH OIL) 1,000 mg (120 mg-180 mg) Cap 1 capsule      rosuvastatin (CRESTOR) 40 MG Tab       tamsulosin (FLOMAX) 0.4 mg Cp24       ubidecarenone/vitamin E mixed (COENZYME Q10-VITAMIN E) 100 mg- 10 unit Cap 1 capsule with a meal      vitamin B complex (B COMPLEX 1 ORAL)       acetaminophen (ACETAMINOPHEN EXTRA STRENGTH) 500 MG tablet 1 tablet as needed      nitroGLYCERIN (NITROSTAT) 0.4 MG SL tablet       ranitidine (ZANTAC) 150 MG capsule 1 tablet      [DISCONTINUED] gabapentin (NEURONTIN) 100 MG capsule       [DISCONTINUED] LORazepam (ATIVAN) 1 MG tablet 1 tablet      [DISCONTINUED] metoprolol tartrate (LOPRESSOR) 25 MG tablet 1 tablet      [DISCONTINUED] tamsulosin (FLOMAX) 0.4 mg Cp24 1 capsule 30 minutes after the same meal each day      [DISCONTINUED] vitamin E, dl, acetate, (E-200) 200 unit Cap 1 capsule       No current facility-administered medications on file prior to visit.      Social History:     Social History   Substance Use Topics    Smoking status: Former Smoker     Quit date: 4/24/2000    Smokeless tobacco: Never Used    Alcohol use 1.2 oz/week     2 Shots of liquor per week      Comment: rarely     Family History:   History reviewed. No pertinent family history.  Physical Exam:   BP (!) 149/67 (BP Location: Right arm, Patient Position: Sitting, BP Method: Large (Automatic))   Pulse (!) 52   Ht 5' 6" (1.676 m)   Wt 73.4 kg (161 lb 13.1 oz)   SpO2 97%   BMI 26.12 kg/m²      Physical Exam  General: alert, awake and oriented x 3  Eyes:PERRL.   Neck:no JVD   Lungs:  clear to auscultation bilaterally   Cardiovascular: Heart: regular rate and rhythm, S1, S2 normal, no murmur, click, rub or gallop.   Chest Wall: no tenderness.   Extremities: no cyanosis or edema.   Abdomen/Rectal: Abdomen: soft, " non-tender non-distented; bowel sounds normal  Neurologic: Normal strength and tone. No focal numbness or weakness     LEFT RIGHT   RADIAL 2+ 2+   ULNAR     BRACHIAL     FEMORAL 1+ 1+   DP nonpalpable nonpalpable   TP nonpalpable nonpalpable   TWYLA'S TEST Normal Normal   BARBEAU TEST         Labs:     Lab Results   Component Value Date     05/21/2018    K 4.2 05/21/2018     05/21/2018    CO2 27 05/21/2018    BUN 18 05/21/2018    CREATININE 1.0 05/21/2018    ANIONGAP 8 05/21/2018     No results found for: HGBA1C  No results found for: BNP, BNPTRIAGEBLO Lab Results   Component Value Date    WBC 5.23 05/21/2018    HGB 12.2 (L) 05/21/2018    HCT 36.7 (L) 05/21/2018     (L) 05/21/2018    GRAN 3.0 05/21/2018    GRAN 58.1 05/21/2018     No results found for: CHOL, HDL, LDLCALC, TRIG         Assessment:     1. Abdominal aortic aneurysm (AAA) without rupture    2. Coronary artery disease, angina presence unspecified, unspecified vessel or lesion type, unspecified whether native or transplanted heart    3. PAD (peripheral artery disease)    4. Type 2 diabetes mellitus with complication, without long-term current use of insulin        Plan:   AAA (abdominal aortic aneurysm) without rupture  -s/p repair with 23 mm ovation IX   -Poor quality U/S abdomen AAA today-repeat U/S abdomen in 1 week  -follow up in 3 months with repeat U/S abdomen AAA after above(regular follow up)    PAD (peripheral artery disease)  -Complains of claudication left >right now René type II b  -U/s b/l lower extremity arterial in 1 week    CAD (coronary artery disease)  -Denies any chest pain   -s/p CABG 2005  -continue aspirin, plavix, statin  DM             KAJAL SWEET  CARDIOLOGY FELLOW, PGY 4  [phone]  I have personally taken the history and examined this patient. I have discussed and agree with the resident's findings and plan as documented in the resident's note.  Dave Hernandez

## 2018-07-06 ENCOUNTER — CLINICAL SUPPORT (OUTPATIENT)
Dept: CARDIOLOGY | Facility: CLINIC | Age: 83
End: 2018-07-06
Attending: INTERNAL MEDICINE
Payer: MEDICARE

## 2018-07-06 DIAGNOSIS — I71.40 ABDOMINAL AORTIC ANEURYSM (AAA) WITHOUT RUPTURE: ICD-10-CM

## 2018-07-06 LAB — VASCULAR ABDOMINAL AORTIC ANEURYSM (AAA): 6.48

## 2018-07-06 PROCEDURE — 93978 VASCULAR STUDY: CPT | Mod: S$GLB,,, | Performed by: INTERNAL MEDICINE

## 2018-07-06 PROCEDURE — 93925 LOWER EXTREMITY STUDY: CPT | Mod: S$GLB,,, | Performed by: INTERNAL MEDICINE

## 2018-07-09 DIAGNOSIS — I73.9 PAD (PERIPHERAL ARTERY DISEASE): Primary | ICD-10-CM

## 2018-07-10 ENCOUNTER — TELEPHONE (OUTPATIENT)
Dept: CARDIOLOGY | Facility: CLINIC | Age: 83
End: 2018-07-10

## 2018-10-02 ENCOUNTER — CLINICAL SUPPORT (OUTPATIENT)
Dept: CARDIOLOGY | Facility: CLINIC | Age: 83
End: 2018-10-02
Attending: INTERNAL MEDICINE
Payer: MEDICARE

## 2018-10-02 ENCOUNTER — OFFICE VISIT (OUTPATIENT)
Dept: CARDIOLOGY | Facility: CLINIC | Age: 83
End: 2018-10-02
Payer: MEDICARE

## 2018-10-02 VITALS
HEIGHT: 66 IN | DIASTOLIC BLOOD PRESSURE: 64 MMHG | BODY MASS INDEX: 26.22 KG/M2 | HEART RATE: 59 BPM | SYSTOLIC BLOOD PRESSURE: 149 MMHG | WEIGHT: 163.13 LBS | OXYGEN SATURATION: 97 %

## 2018-10-02 DIAGNOSIS — I71.40 AAA (ABDOMINAL AORTIC ANEURYSM) WITHOUT RUPTURE: ICD-10-CM

## 2018-10-02 DIAGNOSIS — I71.40 ABDOMINAL AORTIC ANEURYSM (AAA) WITHOUT RUPTURE: Primary | ICD-10-CM

## 2018-10-02 DIAGNOSIS — I73.9 PAD (PERIPHERAL ARTERY DISEASE): ICD-10-CM

## 2018-10-02 DIAGNOSIS — I25.10 CORONARY ARTERY DISEASE INVOLVING NATIVE CORONARY ARTERY WITHOUT ANGINA PECTORIS, UNSPECIFIED WHETHER NATIVE OR TRANSPLANTED HEART: ICD-10-CM

## 2018-10-02 LAB — VASCULAR ABDOMINAL AORTIC ANEURYSM (AAA): 5.91

## 2018-10-02 PROCEDURE — 99213 OFFICE O/P EST LOW 20 MIN: CPT | Mod: PBBFAC | Performed by: INTERNAL MEDICINE

## 2018-10-02 PROCEDURE — 99214 OFFICE O/P EST MOD 30 MIN: CPT | Mod: S$PBB,,, | Performed by: INTERNAL MEDICINE

## 2018-10-02 PROCEDURE — 99999 PR PBB SHADOW E&M-EST. PATIENT-LVL III: CPT | Mod: PBBFAC,,, | Performed by: INTERNAL MEDICINE

## 2018-10-02 PROCEDURE — 1101F PT FALLS ASSESS-DOCD LE1/YR: CPT | Mod: CPTII,,, | Performed by: INTERNAL MEDICINE

## 2018-10-02 PROCEDURE — 93978 VASCULAR STUDY: CPT | Mod: PBBFAC | Performed by: INTERNAL MEDICINE

## 2018-10-02 PROCEDURE — 93925 LOWER EXTREMITY STUDY: CPT | Mod: PBBFAC | Performed by: INTERNAL MEDICINE

## 2018-10-02 NOTE — PROGRESS NOTES
Cardiology Clinic Note  Reason for Visit: Follow up on AAA s/p repair with PAD    HPI:   Mr. Nas Valencia is a pleasant 87 y.o. gentleman who is hard of hearing,H/o CAD s/p 5V CABG 2005, PAD and chronic infra-renal AAA s/p failed attempt at repair in EJ 2014 and DM. Here today for follow up after EVAR on 5/21/2018.    Patient mentioned he is able to walk 3 blocks until he gets the pain at both lower extremities. Pain relived after 15 minutes. He said he is able to do his daily activities with no issues. He is complaint with diet. He underwent AAA repair 5/21/2018 with 23 mm ovation IX and b/l external iliac PTA with 5 x 10 balloon.     He has PMH of AAA since 2005 and it has been monitored by his PCP, severe PAD, DM, CAD s/p CABG.    US AAA 4/2017 - 5.8 cm infra renal AAA  EVAR attempt at  in 05/2014 aborted due to severely calcified femoral arteries  US AAA 01/31/2018 - 6.2 x 7.2 cm infra renal AAA  CTA Abd/Pelvis 2/6/2018 - 6.3 x 6.3 x 7.1 cm  SASHA 6/2017 - R 0.53 and L 0.48, monophasic b/l  TTE 5/3/2017 - EF 55-65%, mild LVH, DD1, mild LAE    Today he is here with his wife, he is in good spirit and denied any acute complains.   ROS:    Constitution: Negative for fever or chills. Negative for weight loss or gain.   HENT: Negative for sore throat or headaches. Negative for rhinorrhea.  Eyes: Negative for blurred or double vision.   Cardiovascular: See above  Pulmonary: Negative for SOB. Negative for cough.   Gastrointestinal: Negative for abdominal pain. Negative for nausea/ vomiting. Negative for diarrhea.   : Negative for dysuria.   Skin: Negative for rashes.  Neurological: Negative for focal weakness or sensory changes.  Psychological: Negative for depression or anxiety.  PMH:     Past Medical History:   Diagnosis Date    Diabetes mellitus      History reviewed. No pertinent surgical history.  Allergies:   Review of patient's allergies indicates:  No Known Allergies  Medications:     Current  "Outpatient Medications on File Prior to Visit   Medication Sig Dispense Refill    acetaminophen (ACETAMINOPHEN EXTRA STRENGTH) 500 MG tablet 1 tablet as needed      aspirin (ECOTRIN) 81 MG EC tablet Take 1 tablet (81 mg total) by mouth once daily.  0    citalopram (CELEXA) 40 MG tablet       clopidogrel (PLAVIX) 75 mg tablet Take 1 tablet (75 mg total) by mouth once daily. 30 tablet 11    losartan (COZAAR) 25 MG tablet       metoprolol succinate (TOPROL-XL) 25 MG 24 hr tablet       multivitamin with minerals tablet       nitroGLYCERIN (NITROSTAT) 0.4 MG SL tablet       omega 3-dha-epa-fish oil (FISH OIL) 1,000 mg (120 mg-180 mg) Cap 1 capsule      ranitidine (ZANTAC) 150 MG capsule 1 tablet      rosuvastatin (CRESTOR) 40 MG Tab       tamsulosin (FLOMAX) 0.4 mg Cp24       ubidecarenone/vitamin E mixed (COENZYME Q10-VITAMIN E) 100 mg- 10 unit Cap 1 capsule with a meal      vitamin B complex (B COMPLEX 1 ORAL)        No current facility-administered medications on file prior to visit.      Social History:     Social History     Tobacco Use    Smoking status: Former Smoker     Last attempt to quit: 2000     Years since quittin.4    Smokeless tobacco: Never Used   Substance Use Topics    Alcohol use: Yes     Alcohol/week: 1.2 oz     Types: 2 Shots of liquor per week     Comment: rarely     Family History:   History reviewed. No pertinent family history.  Physical Exam:   BP (!) 149/64 (BP Location: Right arm, Patient Position: Sitting, BP Method: Large (Automatic))   Pulse (!) 59   Ht 5' 6" (1.676 m)   Wt 74 kg (163 lb 2.3 oz)   SpO2 97%   BMI 26.33 kg/m²      Constitutional: No apparent distress, conversant  HEENT: Sclera anicteric, extraocular movements intact  Neck: No jugular venous distension, no carotid bruits  CV: Regular rate and rhythm, no murmurs rubs or gallops, normal S1/S2  Pulm: Clear to auscultation bilaterally, no wheezes, rales, or ronchi  GI: Abdomen soft, nontender, " nondistended, normoactive bowel sounds  Extremities: No lower extremity edema, warm with palpable pulses  Skin: No ecchymosis, erythema, or ulcers  Psych: Alert and oriented to person place location, appropriate affect  Neuro: No focal deficits    Labs:     Lab Results   Component Value Date     05/21/2018    K 4.2 05/21/2018     05/21/2018    CO2 27 05/21/2018    BUN 18 05/21/2018    CREATININE 1.0 05/21/2018    ANIONGAP 8 05/21/2018     No results found for: AST, ALT, ALKPHOS, BILITOT, BILIDIR, ALBUMIN  Lab Results   Component Value Date    CALCIUM 10.4 05/21/2018     No results found for: BNP, BNPTRIAGEBLO Lab Results   Component Value Date    WBC 5.23 05/21/2018    HGB 12.2 (L) 05/21/2018    HCT 36.7 (L) 05/21/2018     (L) 05/21/2018    GRAN 3.0 05/21/2018    GRAN 58.1 05/21/2018     Lab Results   Component Value Date    INR 1.0 04/24/2018     No results found for: CHOL, HDL, LDLCALC, TRIG  No results found for: HGBA1C  No results found for: TSH, K2HSXWT       Imaging:     No results found for: EF  Assessment:     1. Abdominal aortic aneurysm (AAA) without rupture    2. Coronary artery disease, angina presence unspecified, unspecified vessel or lesion type, unspecified whether native or transplanted heart    3. PAD (peripheral artery disease)    4. Type 2 diabetes mellitus with complication, without long-term current use of insulin      Plan:     AAA (abdominal aortic aneurysm) without rupture  - s/p repair with 23 mm ovation IX well sealed.   - Follow up in 1 year with US.     PAD (peripheral artery disease)  - René 2B.  - U/s b/l lower extremity arterial in 1 week.     CAD (coronary artery disease)  - Denies any chest pain and no SOB.      Attending addendum to follow     Em Dixon MD  Cardiology Fellow  Pager: [phone]        10/2/2018 11:37 AM    Follow-up:   No future appointments.  I have personally taken the history and examined this patient. I have discussed and agree with the  resident's findings and plan as documented in the resident's note.  Dave Hernandez

## 2019-10-04 ENCOUNTER — CLINICAL SUPPORT (OUTPATIENT)
Dept: CARDIOLOGY | Facility: CLINIC | Age: 84
End: 2019-10-04
Attending: INTERNAL MEDICINE
Payer: MEDICARE

## 2019-10-04 ENCOUNTER — OFFICE VISIT (OUTPATIENT)
Dept: CARDIOLOGY | Facility: CLINIC | Age: 84
End: 2019-10-04
Payer: MEDICARE

## 2019-10-04 VITALS
HEIGHT: 64 IN | HEART RATE: 57 BPM | BODY MASS INDEX: 26.39 KG/M2 | OXYGEN SATURATION: 98 % | SYSTOLIC BLOOD PRESSURE: 131 MMHG | DIASTOLIC BLOOD PRESSURE: 61 MMHG | WEIGHT: 154.56 LBS

## 2019-10-04 DIAGNOSIS — I73.9 VASCULAR CLAUDICATION: ICD-10-CM

## 2019-10-04 DIAGNOSIS — K21.9 GASTROESOPHAGEAL REFLUX DISEASE, ESOPHAGITIS PRESENCE NOT SPECIFIED: ICD-10-CM

## 2019-10-04 DIAGNOSIS — I73.9 PAD (PERIPHERAL ARTERY DISEASE): ICD-10-CM

## 2019-10-04 DIAGNOSIS — I73.9 PAD (PERIPHERAL ARTERY DISEASE): Primary | ICD-10-CM

## 2019-10-04 DIAGNOSIS — I25.810 CORONARY ARTERY DISEASE INVOLVING CORONARY BYPASS GRAFT OF NATIVE HEART WITHOUT ANGINA PECTORIS: ICD-10-CM

## 2019-10-04 DIAGNOSIS — I71.40 ABDOMINAL AORTIC ANEURYSM (AAA) WITHOUT RUPTURE: ICD-10-CM

## 2019-10-04 DIAGNOSIS — E11.51 TYPE 2 DIABETES MELLITUS WITH DIABETIC PERIPHERAL ANGIOPATHY WITHOUT GANGRENE, WITHOUT LONG-TERM CURRENT USE OF INSULIN: ICD-10-CM

## 2019-10-04 DIAGNOSIS — I71.40 AAA (ABDOMINAL AORTIC ANEURYSM) WITHOUT RUPTURE: ICD-10-CM

## 2019-10-04 LAB
ABDOMINAL IMA AP: 3.15 CM
ABDOMINAL IMA TRANS: 3.64 CM
ABDOMINAL INFRARENAL AORTA AP: 5.51 CM
ABDOMINAL INFRARENAL AORTA TRANS: 5.74 CM
ABDOMINAL JUXTARENAL AORTA AP: 2.62 CM
ABDOMINAL JUXTARENAL AORTA ED VEL: 0 CM/S
ABDOMINAL JUXTARENAL AORTA PS VEL: 36 CM/S
ABDOMINAL JUXTARENAL AORTA TRANS: 2.38 CM
ABDOMINAL LT COM ILIAC AP: 0.82 CM
ABDOMINAL LT COM ILIAC TRANS: 0.84 CM
ABDOMINAL RT COM ILIAC AP: 0.97 CM
ABDOMINAL RT COM ILIAC TRANS: 0.91 CM
ABDOMINAL RT COM ILIAC VEL: 93 CM/S
ABDOMINAL RT COM ILLIAC ED VEL: 0 CM/S
ABDOMINAL SUPRARENAL AORTA AP: 3.39 CM
ABDOMINAL SUPRARENAL AORTA ED VEL: 0 CM/S
ABDOMINAL SUPRARENAL AORTA PS VEL: 57 CM/S
ABDOMINAL SUPRARENAL AORTA TRANS: 2.9 CM
LEFT ANT TIBIAL SYS PSV: 32 CM/S
LEFT CFA PSV: 47 CM/S
LEFT EXTERNAL ILIAC PSV: 62 CM/S
LEFT PERONEAL SYS PSV: 14 CM/S
LEFT POPLITEAL PSV: 0 CM/S
LEFT POST TIBIAL SYS PSV: 24 CM/S
LEFT PROFUNDA SYS PSV: 639 CM/S
LEFT SUPER FEMORAL DIST SYS PSV: 0 CM/S
LEFT SUPER FEMORAL MID SYS PSV: 0 CM/S
LEFT SUPER FEMORAL OSTIAL SYS PSV: 27 CM/S
LEFT SUPER FEMORAL PROX SYS PSV: 0 CM/S
LEFT TIB/PER TRUNK SYS PSV: 20 CM/S
OHS CV AAA LARGEST SIZE: 5.74 CM
OHS CV LEFT LOWER EXTREMITY ABI (NO CALC): 0.45
OHS CV RIGHT ABI LOWER EXTREMITY (NO CALC): 0.53
RIGHT ANT TIBIAL SYS PSV: 42 CM/S
RIGHT CFA PSV: 142 CM/S
RIGHT EXTERNAL ILLIAC PSV: 109 CM/S
RIGHT PERONEAL SYS PSV: 0 CM/S
RIGHT POPLITEAL PSV: 23 CM/S
RIGHT POST TIBIAL SYS PSV: 22 CM/S
RIGHT PROFUNDA SYS PSV: 445 CM/S
RIGHT SUPER FEMORAL DIST SYS PSV: 0 CM/S
RIGHT SUPER FEMORAL MID SYS PSV: 43 CM/S
RIGHT SUPER FEMORAL OSTIAL SYS PSV: 190 CM/S
RIGHT SUPER FEMORAL PROX SYS PSV: 54 CM/S
RIGHT TIB/PER TRUNK SYS PSV: 0 CM/S

## 2019-10-04 PROCEDURE — 93978 VASCULAR STUDY: CPT | Mod: S$GLB,,, | Performed by: INTERNAL MEDICINE

## 2019-10-04 PROCEDURE — 93925 LOWER EXTREMITY STUDY: CPT | Mod: S$GLB,,, | Performed by: INTERNAL MEDICINE

## 2019-10-04 PROCEDURE — 93978 CV US ABDOMINAL AORTA EVALUATION: ICD-10-PCS | Mod: S$GLB,,, | Performed by: INTERNAL MEDICINE

## 2019-10-04 PROCEDURE — 99215 OFFICE O/P EST HI 40 MIN: CPT | Mod: S$GLB,,, | Performed by: PHYSICIAN ASSISTANT

## 2019-10-04 PROCEDURE — 99999 PR PBB SHADOW E&M-EST. PATIENT-LVL III: ICD-10-PCS | Mod: PBBFAC,,, | Performed by: PHYSICIAN ASSISTANT

## 2019-10-04 PROCEDURE — 99999 PR PBB SHADOW E&M-EST. PATIENT-LVL III: CPT | Mod: PBBFAC,,, | Performed by: PHYSICIAN ASSISTANT

## 2019-10-04 PROCEDURE — 99215 PR OFFICE/OUTPT VISIT, EST, LEVL V, 40-54 MIN: ICD-10-PCS | Mod: S$GLB,,, | Performed by: PHYSICIAN ASSISTANT

## 2019-10-04 PROCEDURE — 93925 CV US DOPPLER ARTERIAL LEGS BILATERAL: ICD-10-PCS | Mod: S$GLB,,, | Performed by: INTERNAL MEDICINE

## 2019-10-04 RX ORDER — ASPIRIN 81 MG/1
81 TABLET ORAL DAILY
Qty: 30 TABLET | Refills: 11
Start: 2019-10-04 | End: 2020-10-03

## 2019-10-04 RX ORDER — AMLODIPINE BESYLATE 5 MG/1
5 TABLET ORAL DAILY
COMMUNITY

## 2019-10-04 RX ORDER — ALBUTEROL SULFATE 90 UG/1
AEROSOL, METERED RESPIRATORY (INHALATION)
COMMUNITY

## 2019-10-04 RX ORDER — MONTELUKAST SODIUM 10 MG/1
TABLET ORAL
COMMUNITY

## 2019-10-04 RX ORDER — NAPROXEN 250 MG/1
500 TABLET ORAL 2 TIMES DAILY WITH MEALS
COMMUNITY

## 2019-10-04 NOTE — PROGRESS NOTES
Interventional Cardiology Clinic Note  Reason for Visit: Follow up on AAA s/p repair with PAD    HPI:   Mr. Nas Valencia is a pleasant 88 y.o. gentleman who is hard of hearing,H/o CAD s/p 5V CABG 2005, PAD, and infra-renal AAA s/p EVAR (5/21/18) and DM.     Patient states he still does his walking regimen. He walks 400 feet around his condominium and then sits and rests until the claudication resolves and then repeats this process two more times. When he leaves the condo, he takes the elevator down to the first floor, but always takes the stairs back up to the second floor. He feels cramping pain in his bilateral calves with exertion, left more than right. His symptoms have been stable over the past year. Two weeks ago, he felt like he was walking well and tried to run but immediately fell down due to the pain and scraped his left knee. He said he is able to do his daily activities with no issues, but does experience daily claudication.     He underwent AAA repair 5/21/2018 with 23 mm ovation IX and b/l external iliac PTA with 5 x 10 balloon. His abdominal ultrasound this morning showed stable AAA with no flow in aneurysmal sack. His lower extremity ultrasound this morning showed  of b/l SFA,  of right TPT and peroneal, and L popliteal occlusion. He has severe stenosis of b/l profunda.     From a cardiac standpoint, he is stable. He denies any angina, PINEDA, SOB, orthopnea, PND, dizziness, syncope, palpitations, or lower extremity edema. He denies non-healing wounds or rest pain. Of note, he states his cardiologist took him off Plavix about 2 weeks ago. He is not sure why. He took himself off ASA, because he takes Naproxen for pain and did not want to be on two NSAIDs. He still takes Crestor, but is unsure whether or not he is on Metoprolol.     EVAR attempt at  in 05/2014 aborted due to severely calcified femoral arteries  TTE 5/3/2017 - EF 55-65%, mild LVH, DD1, mild LAE  CTA Abd/Pelvis  2/6/2018 - 6.3 x 6.3 x 7.1 cm  US AAA 10/02/2018 - 5.91 x 5.59 cm infrarenal AAA  SASHA 10/2019 - R 0.53 and L 0.45, monophasic b/l    ROS:    Constitution: Negative for diaphoresis and malaise/fatigue.   HENT: Negative for nosebleeds.  Cardiovascular: Negative for chest pain, dyspnea on exertion, leg swelling, near-syncope, orthopnea, palpitations, paroxysmal nocturnal dyspnea and syncope. Positive for claudication.   Respiratory: Negative for shortness of breath, snoring and wheezing.    Hematologic/Lymphatic: Negative for bleeding problem. Does not bruise/bleed easily.   Musculoskeletal: Negative for muscle cramps and myalgias.   Gastrointestinal: Negative for bloating, abdominal pain, nausea and vomiting.   Neurological: Negative for dizziness, headaches and light-headedness.   PMH:     Past Medical History:   Diagnosis Date    Diabetes mellitus      History reviewed. No pertinent surgical history.  Allergies:     Review of patient's allergies indicates:   Allergen Reactions    Gabapentin Hallucinations     Medications:     Current Outpatient Medications on File Prior to Visit   Medication Sig Dispense Refill    acetaminophen (ACETAMINOPHEN EXTRA STRENGTH) 500 MG tablet 1 tablet as needed      albuterol (PROVENTIL/VENTOLIN HFA) 90 mcg/actuation inhaler 2 puffs as needed      amLODIPine (NORVASC) 5 MG tablet Take 5 mg by mouth once daily.      citalopram (CELEXA) 40 MG tablet       metoprolol succinate (TOPROL-XL) 25 MG 24 hr tablet       montelukast (SINGULAIR) 10 mg tablet 1 tablet in the evening      multivitamin with minerals tablet       naproxen (NAPROSYN) 250 MG tablet Take 500 mg by mouth 2 (two) times daily with meals.      omega 3-dha-epa-fish oil (FISH OIL) 1,000 mg (120 mg-180 mg) Cap 1 capsule      rosuvastatin (CRESTOR) 40 MG Tab       tamsulosin (FLOMAX) 0.4 mg Cp24       ubidecarenone/vitamin E mixed (COENZYME Q10-VITAMIN E) 100 mg- 10 unit Cap 1 capsule with a meal      vitamin B  "complex (B COMPLEX 1 ORAL)       clopidogrel (PLAVIX) 75 mg tablet Take 1 tablet (75 mg total) by mouth once daily. 30 tablet 11    losartan (COZAAR) 25 MG tablet       nitroGLYCERIN (NITROSTAT) 0.4 MG SL tablet       ranitidine (ZANTAC) 150 MG capsule 1 tablet      [DISCONTINUED] aspirin (ECOTRIN) 81 MG EC tablet Take 1 tablet (81 mg total) by mouth once daily.  0     No current facility-administered medications on file prior to visit.      Social History:     Social History     Tobacco Use    Smoking status: Former Smoker     Last attempt to quit: 2000     Years since quittin.4    Smokeless tobacco: Never Used   Substance Use Topics    Alcohol use: Never     Frequency: Never     Family History:   History reviewed. No pertinent family history.  Physical Exam:   /61 (BP Location: Right arm, Patient Position: Sitting, BP Method: Large (Automatic))   Pulse (!) 57   Ht 5' 4" (1.626 m)   Wt 70.1 kg (154 lb 8.7 oz)   SpO2 98%   BMI 26.53 kg/m²      Constitutional: NAD, conversant  HEENT: Sclera anicteric, PERRLA, EOMI  Neck: No JVD, no carotid bruits  CV: RRR, no murmur, normal S1/S2  Pulm: CTAB, no wheezes, rales, or ronchi  GI: Abdomen soft, NTND, +BS  Extremities: No LE edema, dorsalis pedis and posterior tibial pulses dopplerable bilaterally, no lower extremity wounds  Skin: No ecchymosis, erythema, or ulcers  Psych: AOx3, appropriate affect  Neuro: No gross deficits    Labs:     Lab Results   Component Value Date     2018    K 4.2 2018     2018    CO2 27 2018    BUN 18 2018    CREATININE 1.0 2018    ANIONGAP 8 2018     No results found for: HGBA1C  No results found for: BNP, BNPTRIAGEBLO Lab Results   Component Value Date    WBC 5.23 2018    HGB 12.2 (L) 2018    HCT 36.7 (L) 2018     (L) 2018    GRAN 3.0 2018    GRAN 58.1 2018     No results found for: CHOL, HDL, LDLCALC, TRIG       Imaging: "   1. US arterial lower extremity (10/04/2019):  · Bilateral SASHA's are reduced consistent with moderate to severe bilateral LE PAD.  · Right superficial femoral artery, tibioperoneal trunk, and posterior tibial artery are totally occluded.  · Severe stenosis of the right profundus femoral artery.  · Left superficial femoral and popliteal artery are totally occluded.  · Severe stenosis of the left profundus femoral artery.     2. Abdominal aortic ultrasound (10/4/19)  · Post PEVAR with no flow seen in the aneurys sac.  · Aneurysm sac has decreased in size to 5.74 cm post endograft in it's largest diameter in the infrarenal aorta.  · The suprarenal aorta and left common iliac artery are not well visualized.      Assessment:     1. PAD (peripheral artery disease)    2. Vascular claudication    3. Gastroesophageal reflux disease, esophagitis presence not specified    4. Coronary artery disease involving coronary bypass graft of native heart without angina pectoris    5. Type 2 diabetes mellitus with diabetic peripheral angiopathy without gangrene, without long-term current use of insulin    6. AAA (abdominal aortic aneurysm) without rupture      Plan:     PAD (peripheral artery disease)  -- René 2B.  -- Arterial ultrasound with severe profunda stenosis bilaterally. Imaging reviewed by Dr. Hernandez.   -- Continue Crestor 40 mg qhs.  -- Continue walking exercise regimen.   -- Resume ASA 81 mg daily.   -- Order CTA with run-off of lower extremities.    CAD (coronary artery disease)  -- Resume ASA 81 mg daily. Taken off Plavix 2 weeks ago by general cardiologist, Dr Carlos.   -- Continue Crestor 40 mg qhs  -- Stable from CAD standpoint.     AAA (abdominal aortic aneurysm) without rupture  - s/p repair with 23 mm ovation IX well sealed.   - Abd US today shows AAA decreased in size and no flow in aneurysmal sac. Imaging reviewed by Dr. Hernandez.   - Follow up in 1 year with US.      Signed:  Meliza Weston  [facility]  Interventional Cardiology   10/4/2019 10:47 AM

## 2019-10-24 ENCOUNTER — HOSPITAL ENCOUNTER (OUTPATIENT)
Dept: RADIOLOGY | Facility: HOSPITAL | Age: 84
Discharge: HOME | End: 2019-10-24
Attending: PHYSICIAN ASSISTANT
Payer: MEDICARE

## 2019-10-24 DIAGNOSIS — I73.9 VASCULAR CLAUDICATION: ICD-10-CM

## 2019-10-24 LAB
CREAT SERPL-MCNC: 0.8 MG/DL (ref 0.5–1.4)
SAMPLE: NORMAL

## 2019-10-24 PROCEDURE — 25500020 PHARM REV CODE 255: Performed by: PHYSICIAN ASSISTANT

## 2019-10-24 PROCEDURE — 75635 CTA RUNOFF ABD PEL BILAT LOWER EXT: ICD-10-PCS | Mod: 26,,, | Performed by: RADIOLOGY

## 2019-10-24 PROCEDURE — 75635 CT ANGIO ABDOMINAL ARTERIES: CPT | Mod: TC

## 2019-10-24 PROCEDURE — 75635 CT ANGIO ABDOMINAL ARTERIES: CPT | Mod: 26,,, | Performed by: RADIOLOGY

## 2019-10-24 RX ADMIN — IOHEXOL 145 ML: 350 INJECTION, SOLUTION INTRAVENOUS at 01:10

## 2019-10-31 ENCOUNTER — TELEPHONE (OUTPATIENT)
Dept: CARDIOLOGY | Facility: CLINIC | Age: 84
End: 2019-10-31

## 2019-10-31 NOTE — TELEPHONE ENCOUNTER
Called patient with results of CTA. Verbalized understanding. Will f/u in 1yr. With Meliza Weston.

## 2020-10-20 PROBLEM — I10 ESSENTIAL HYPERTENSION: Status: ACTIVE | Noted: 2020-10-20

## 2020-10-20 PROBLEM — J44.9 CHRONIC OBSTRUCTIVE PULMONARY DISEASE: Status: ACTIVE | Noted: 2020-10-20

## 2020-11-04 ENCOUNTER — TELEPHONE (OUTPATIENT)
Dept: CARDIOLOGY | Facility: CLINIC | Age: 85
End: 2020-11-04

## 2020-11-09 ENCOUNTER — DOCUMENTATION ONLY (OUTPATIENT)
Dept: CARDIOLOGY | Facility: CLINIC | Age: 85
End: 2020-11-09

## 2022-11-27 PROCEDURE — 82962 GLUCOSE BLOOD TEST: CPT

## 2022-11-28 ENCOUNTER — TELEPHONE (OUTPATIENT)
Dept: WOUND CARE | Facility: HOSPITAL | Age: 87
End: 2022-11-28
Payer: MEDICARE

## 2022-11-28 ENCOUNTER — HOSPITAL ENCOUNTER (EMERGENCY)
Facility: HOSPITAL | Age: 87
Discharge: HOME | End: 2022-11-28
Attending: EMERGENCY MEDICINE
Payer: MEDICARE

## 2022-11-28 VITALS
SYSTOLIC BLOOD PRESSURE: 135 MMHG | OXYGEN SATURATION: 98 % | DIASTOLIC BLOOD PRESSURE: 68 MMHG | RESPIRATION RATE: 18 BRPM | TEMPERATURE: 98 F | HEART RATE: 74 BPM

## 2022-11-28 DIAGNOSIS — S81.801A OPEN WOUND OF RIGHT LOWER LEG, INITIAL ENCOUNTER: Primary | ICD-10-CM

## 2022-11-28 LAB — POCT GLUCOSE: 180 MG/DL (ref 70–110)

## 2022-11-28 PROCEDURE — 90715 TDAP VACCINE 7 YRS/> IM: CPT | Performed by: EMERGENCY MEDICINE

## 2022-11-28 PROCEDURE — 25000003 PHARM REV CODE 250: Performed by: EMERGENCY MEDICINE

## 2022-11-28 PROCEDURE — 90471 IMMUNIZATION ADMIN: CPT | Performed by: EMERGENCY MEDICINE

## 2022-11-28 PROCEDURE — 63600175 PHARM REV CODE 636 W HCPCS: Performed by: EMERGENCY MEDICINE

## 2022-11-28 PROCEDURE — 99284 EMERGENCY DEPT VISIT MOD MDM: CPT | Mod: 25

## 2022-11-28 RX ORDER — CLINDAMYCIN HYDROCHLORIDE 150 MG/1
300 CAPSULE ORAL 4 TIMES DAILY
Qty: 56 CAPSULE | Refills: 0 | Status: SHIPPED | OUTPATIENT
Start: 2022-11-28 | End: 2022-12-05

## 2022-11-28 RX ORDER — CLINDAMYCIN HYDROCHLORIDE 150 MG/1
300 CAPSULE ORAL
Status: COMPLETED | OUTPATIENT
Start: 2022-11-28 | End: 2022-11-28

## 2022-11-28 RX ADMIN — BACITRACIN ZINC, NEOMYCIN, POLYMYXIN B 1 EACH: 400; 3.5; 5 OINTMENT TOPICAL at 01:11

## 2022-11-28 RX ADMIN — TETANUS TOXOID, REDUCED DIPHTHERIA TOXOID AND ACELLULAR PERTUSSIS VACCINE, ADSORBED 0.5 ML: 5; 2.5; 8; 8; 2.5 SUSPENSION INTRAMUSCULAR at 01:11

## 2022-11-28 RX ADMIN — CLINDAMYCIN HYDROCHLORIDE 300 MG: 150 CAPSULE ORAL at 01:11

## 2022-11-28 NOTE — TELEPHONE ENCOUNTER
Attempted to call patient in regards to wound care referral that was placed by [facility]. Patient did not answer his phone, but was able to leave a detailed message asking patient to return call to [facility] at   # [phone] option 1

## 2022-11-28 NOTE — ED NOTES
Sophie arrived to transport pt back to [facility]. Pt left the ER with discharge paperwork, prescription and top dentures. Pt left in stable condition.

## 2022-11-28 NOTE — ED NOTES
Patient with complaints of having right lower leg swelling with redness, fluid leaking, and pain. Skin noted to be peeling. Denies any trauma or fall. No treatment attempted prior to arrival.     Review of patient's allergies indicates:   Allergen Reactions    Gabapentin Hallucinations        Patient has verified the spelling of the name and  on armband.   APPEARANCE: Patient is alert, calm, oriented x 4, and does not appear distressed.  SKIN: Skin is normal for race, warm, and dry. Normal skin turgor and mucous membranes moist. RLE reddened in appearance with swelling, weeping peeled skin appearance.   RESPIRATORY:Normal rate and effort. Breath sounds clear bilaterally throughout chest. Respirations are equal and unlabored.    MUSCLE: Full ROM. No bony tenderness or soft tissue tenderness. No obvious deformity.  PERIPHERAL VASCULAR: peripheral pulses present. Normal cap refill. Edema noted to the RLE. Warm to touch.

## 2022-11-28 NOTE — ED PROVIDER NOTES
Encounter Date: 2022       History     Chief Complaint   Patient presents with    Leg Pain     Complaining of right leg pain.  Right lower red, skin peeling. States oozing started an hour ago.     HPI  89 y.o.    Co RLE pretibial peeling and oozing    Has known PAD    Denies known injury    Sts started tonight    Afebrile    Able to walk    Review of patient's allergies indicates:   Allergen Reactions    Gabapentin Hallucinations     Past Medical History:   Diagnosis Date    Diabetes mellitus      No past surgical history on file.  No family history on file.  Social History     Tobacco Use    Smoking status: Former     Types: Cigarettes     Quit date: 2000     Years since quittin.6    Smokeless tobacco: Never   Substance Use Topics    Alcohol use: Never     Review of Systems  All systems were reviewed/examined and were negative except as noted in the HPI.    Physical Exam     Initial Vitals [22 2359]   BP Pulse Resp Temp SpO2   (!) 145/62 70 18 98.1 °F (36.7 °C) 96 %      MAP       --         Physical Exam    General: the patient is awake, alert, and in no apparent distress.    Very [place]  Head: normocephalic and atraumatic, sclera are clear  Neck: supple without meningismus  Chest: clear to auscultation bilaterally, no respiratory distress  Heart: regular rate and rhythm  Extremities: warm and signs of vascular disease; hair loss, shiny skin, thickened nails, he has palp weak pulses bilaterally and brisk CR bilaterally     Pretibial R there is a 5x5cm area of skin sloughing, no obvious exudate, not foul smelling  Skin: warm and dry  Psych conversant  Neuro: awake, alert, moving all extremities    ED Course   Procedures  Labs Reviewed   POCT GLUCOSE - Abnormal; Notable for the following components:       Result Value    POCT Glucose 180 (*)     All other components within normal limits          Imaging Results    None          Medications   neomycin-bacitracnZn-polymyxnB packet 1 each (1 each  Topical (Top) Given 11/28/22 0100)   clindamycin capsule 300 mg (300 mg Oral Given 11/28/22 0100)   Tdap (BOOSTRIX) vaccine injection 0.5 mL (0.5 mLs Intramuscular Given 11/28/22 0100)                    Medical Decision Making:    This is an emergent evaluation of a patient presenting to the ED.  Nursing notes were reviewed.    I decided to obtain and review old medical records, which showed: h/o PAD    Glucose not sig elev    TDAP    Wound care    Abx    Wound referral    León Gonzales MD, ARELI              Clinical Impression:   Final diagnoses:  [S81.801A] Open wound of right lower leg, initial encounter (Primary)        ED Disposition Condition    Discharge Stable          ED Prescriptions       Medication Sig Dispense Start Date End Date Auth. Provider    clindamycin (CLEOCIN) 150 MG capsule Take 2 capsules (300 mg total) by mouth 4 (four) times daily. for 7 days 56 capsule 11/28/2022 12/5/2022 Erasmo Gonzales MD          Follow-up Information       Follow up With Specialties Details Why Contact Info Additional Information    [facility] Wound [facility] Wound Care Schedule an appointment as soon as possible for a visit   [address] [place] [id number] [phone] Please park in Lot C or D and use the Kristen Webb entrance. Check in at Medical Office Building [address].    Betsy Anna MD Internal Medicine   [address] [place] [address] [id number] [phone]             Discharged to home in stable condition, return to ED warnings given, follow up and patient care instructions given.      León Gonzales MD, ARELI, [facility]  Department of Emergency Medicine       Erasmo Gonzales MD  11/29/22 8217

## 2022-12-04 ENCOUNTER — HOSPITAL ENCOUNTER (EMERGENCY)
Facility: HOSPITAL | Age: 87
Discharge: HOME | End: 2022-12-04
Attending: EMERGENCY MEDICINE
Payer: MEDICARE

## 2022-12-04 VITALS
RESPIRATION RATE: 18 BRPM | OXYGEN SATURATION: 99 % | DIASTOLIC BLOOD PRESSURE: 76 MMHG | HEART RATE: 76 BPM | SYSTOLIC BLOOD PRESSURE: 144 MMHG | TEMPERATURE: 98 F

## 2022-12-04 DIAGNOSIS — G89.29 CHRONIC PAIN OF RIGHT LOWER EXTREMITY: Primary | ICD-10-CM

## 2022-12-04 DIAGNOSIS — M79.604 CHRONIC PAIN OF RIGHT LOWER EXTREMITY: Primary | ICD-10-CM

## 2022-12-04 PROCEDURE — 99283 EMERGENCY DEPT VISIT LOW MDM: CPT

## 2022-12-04 PROCEDURE — 25000003 PHARM REV CODE 250: Performed by: EMERGENCY MEDICINE

## 2022-12-04 RX ORDER — HYDROCODONE BITARTRATE AND ACETAMINOPHEN 5; 325 MG/1; MG/1
1 TABLET ORAL
Status: COMPLETED | OUTPATIENT
Start: 2022-12-04 | End: 2022-12-04

## 2022-12-04 RX ADMIN — HYDROCODONE BITARTRATE AND ACETAMINOPHEN 1 TABLET: 5; 325 TABLET ORAL at 03:12

## 2022-12-04 NOTE — ED PROVIDER NOTES
Encounter Date: 2022       History     Chief Complaint   Patient presents with    Leg Pain     Patient brought in by EMS for complaints of bilateral leg pain. Was recently seen in ER for diabetic wound to right leg. States he is supposed to be getting wound care for 2 days a week. Unable to visualize legs. Both looked newly wrapped.     HPI    89-year-old male presents the ER for evaluation of right lower extremity pain.  He has a chronic open wound in his right lower leg, seen by wound care.  He reports that he had sudden onset of 10/10 pain, Tylenol just does not cutting it .  No fever chills.  Came to the ER for further evaluation.    Review of patient's allergies indicates:   Allergen Reactions    Gabapentin Hallucinations     Past Medical History:   Diagnosis Date    Diabetes mellitus      No past surgical history on file.  No family history on file.  Social History     Tobacco Use    Smoking status: Former     Types: Cigarettes     Quit date: 2000     Years since quittin.6    Smokeless tobacco: Never   Substance Use Topics    Alcohol use: Never     Review of Systems   Constitutional:  Negative for fatigue and fever.   Respiratory:  Negative for shortness of breath.    Cardiovascular:  Negative for chest pain.   Gastrointestinal:  Negative for abdominal pain.   Musculoskeletal:  Positive for arthralgias and myalgias.   Skin:  Positive for wound.   All other systems reviewed and are negative.    Physical Exam     Initial Vitals [22 0203]   BP Pulse Resp Temp SpO2   (!) 144/57 74 18 99.1 °F (37.3 °C) 99 %      MAP       --         Physical Exam    Nursing note and vitals reviewed.  Constitutional: He appears well-developed and well-nourished. No distress.   HENT:   Head: Normocephalic and atraumatic.   Pulmonary/Chest: No respiratory distress.   Musculoskeletal:      Comments: Chronic wasting of both lower extremities, bandage noted on both lower extremities, removed right bandage, chronic  well-appearing wounds noted, no sign of superimposed infection, dressing bandage seemed to be tight, with diffuse induration from the pressure bandage, no neurovascular compromise in distal extremities     Neurological: He is alert and oriented to person, place, and time. He has normal strength. GCS score is 15. GCS eye subscore is 4. GCS verbal subscore is 5. GCS motor subscore is 6.   Skin: Skin is warm and dry. Capillary refill takes less than 2 seconds.   Psychiatric: He has a normal mood and affect. Thought content normal.       ED Course   Procedures  Labs Reviewed - No data to display       Imaging Results    None          Medications   HYDROcodone-acetaminophen 5-325 mg per tablet 1 tablet (1 tablet Oral Given 12/4/22 2838)     Medical Decision Making:   Initial Assessment:   89-year-old male presents the ER for evaluation of acute on chronic right leg pain.  Is going through wound care, wounds appeared recently dressed.  Remove dressing, they appeared to be too tight with induration from where the dressings were.  No neurovascular compromise no obvious superimposed infection.  Will plan symptomatic support reassess will re-dress rooms and plan discharge.                        Clinical Impression:   Final diagnoses:  [M79.604, G89.29] Chronic pain of right lower extremity (Primary)      ED Disposition Condition    Discharge Stable          ED Prescriptions    None       Follow-up Information       Follow up With Specialties Details Why Contact Info    Betsy Anna MD Internal Medicine Schedule an appointment as soon as possible for a visit  As needed [address]  [facility] [address]  [phone]               Andres Hollis MD  12/04/22 9261

## 2022-12-16 ENCOUNTER — HOSPITAL ENCOUNTER (EMERGENCY)
Facility: HOSPITAL | Age: 87
Discharge: HOME | End: 2022-12-16
Attending: EMERGENCY MEDICINE
Payer: MEDICARE

## 2022-12-16 VITALS
OXYGEN SATURATION: 99 % | HEART RATE: 63 BPM | SYSTOLIC BLOOD PRESSURE: 109 MMHG | DIASTOLIC BLOOD PRESSURE: 55 MMHG | TEMPERATURE: 98 F | RESPIRATION RATE: 32 BRPM

## 2022-12-16 DIAGNOSIS — M79.605 PAIN IN BOTH LOWER EXTREMITIES: Primary | ICD-10-CM

## 2022-12-16 DIAGNOSIS — M79.604 PAIN IN BOTH LOWER EXTREMITIES: Primary | ICD-10-CM

## 2022-12-16 PROCEDURE — 25000003 PHARM REV CODE 250: Performed by: EMERGENCY MEDICINE

## 2022-12-16 PROCEDURE — 99283 EMERGENCY DEPT VISIT LOW MDM: CPT

## 2022-12-16 RX ORDER — ACETAMINOPHEN 500 MG
1000 TABLET ORAL
Status: COMPLETED | OUTPATIENT
Start: 2022-12-16 | End: 2022-12-16

## 2022-12-16 RX ORDER — OXYCODONE HYDROCHLORIDE 5 MG/1
5 TABLET ORAL
Status: COMPLETED | OUTPATIENT
Start: 2022-12-16 | End: 2022-12-16

## 2022-12-16 RX ORDER — ACETAMINOPHEN 500 MG
1000 TABLET ORAL EVERY 6 HOURS PRN
Qty: 112 TABLET | Refills: 0 | Status: SHIPPED | OUTPATIENT
Start: 2022-12-16 | End: 2022-12-30

## 2022-12-16 RX ADMIN — OXYCODONE HYDROCHLORIDE 5 MG: 5 TABLET ORAL at 04:12

## 2022-12-16 RX ADMIN — ACETAMINOPHEN 1000 MG: 500 TABLET ORAL at 04:12

## 2022-12-16 NOTE — ED PROVIDER NOTES
Encounter Date: 2022    SCRIBE #1 NOTE: I, Donnymj Ayers Jr, am scribing for, and in the presence of,  Perlita Clement MD. I have scribed the following portions of the note - Other sections scribed: HPI, ROS, MDM.     History     Chief Complaint   Patient presents with    Leg Pain     Pt complains of bilateral lower leg pain x 2 weeks with wounds noted, pt arrived from [facility], seen here recently for same      Nas Valencia is a 89-year-old male who has a past medical history of diabetes mellitus.The patient presents to the emergency department for evaluation of bilateral leg pain. Pt thinks there is sometimes drainage from the wound. He does not know what medications he receives for the pain.In the emergency department, he mentioned the pain fluctuates on a daily basis. He denies fever.    I was able to contact pt's independent living facility and was told that he called EMS himself from his cell phone. They provided his caretaker's number, Violette at [phone] who said that pt receives wound care 2-3 times a week from a private nurse. He has home health 12 hours a day but lives independently. Has motrin at home which she sees him take but he never takes Tylenol.         The history is provided by the patient, the nursing home and a caregiver. No  was used.   Review of patient's allergies indicates:   Allergen Reactions    Gabapentin Hallucinations     Past Medical History:   Diagnosis Date    Diabetes mellitus      No past surgical history on file.  No family history on file.  Social History     Tobacco Use    Smoking status: Former     Types: Cigarettes     Quit date: 2000     Years since quittin.6    Smokeless tobacco: Never   Substance Use Topics    Alcohol use: Never     Review of Systems   Constitutional:  Negative for chills and fever.   HENT:  Negative for sore throat.    Respiratory:  Negative for shortness of breath.    Cardiovascular:  Negative for chest  pain.   Gastrointestinal:  Negative for nausea.   Genitourinary:  Negative for dysuria.   Musculoskeletal:  Negative for back pain.        Positive bilateral leg pain.   Skin:  Positive for wound. Negative for rash.   Neurological:  Negative for weakness.   Hematological:  Does not bruise/bleed easily.     Physical Exam     Initial Vitals [12/16/22 1519]   BP Pulse Resp Temp SpO2   (!) 172/90 68 18 97.8 °F (36.6 °C) 99 %      MAP       --         Physical Exam    Constitutional: He appears well-developed and well-nourished. No distress.   HENT:   Head: Normocephalic and atraumatic.   Eyes: EOM are normal.   Neck:   Normal range of motion.  Cardiovascular:  Normal rate.           Pulmonary/Chest: No respiratory distress.   Abdominal: Abdomen is soft. He exhibits no distension.   Musculoskeletal:         General: Normal range of motion.      Cervical back: Normal range of motion.      Comments: See clinical image below. Chronic appearing skin changes. No overlying  warmth. 2+ DP pulses bilaterally.      Neurological: He is alert.   Skin: Skin is warm and dry.   Psychiatric: He has a normal mood and affect.         ED Course   Procedures  Labs Reviewed - No data to display       Imaging Results    None          Medications   acetaminophen tablet 1,000 mg (1,000 mg Oral Given 12/16/22 1622)   oxyCODONE immediate release tablet 5 mg (5 mg Oral Given 12/16/22 1622)     Medical Decision Making:   History:   I obtained history from: someone other than patient.  Old Medical Records: I decided to obtain old medical records.  Initial Assessment:   Nas Valencia is a 89-year-old male who has a past medical history of diabetes mellitus.The patient presents to the emergency department for evaluation of bilateral leg pain; onset two weeks. Associated symptoms include: bilateral leg drainage and wound.   ED Management:  89 yo M bib EMS for acute on chronic bilateral lower extremity leg pain. No e/o infection. Chronic wounds noted  on anterior tibia, but no e/o active infection necessitating abx or additional w/u. Extremities are NVI. Given a percocet. Was later able to contact caregiver who said he is receiving wound care and only takes motrin. Instructed that he should also take Tylenol. Pt discharged back to facility.     The patient is comfortable with this plan and comfortable going home at this time. After taking into careful account the historical factors and physical exam findings of the patient's presentation today, in conjunction with the empirical and objective data obtained on ED workup, no acute emergent medical condition has been identified. The patient appears to be low risk for an emergent medical condition and I feel it is safe and appropriate at this time for the patient to be discharged to follow-up as detailed in their discharge instructions for reevaluation and possible continued outpatient workup and management. I have discussed the specifics of the workup with the patient and the patient has verbalized understanding of the details of the workup, the diagnosis, the treatment plan, and the need for outpatient follow-up.  Although the patient has no emergent etiology today this does not preclude the development of an emergent condition so in addition, I have advised the patient that they can return to the ED and/or activate EMS at any time with worsening of their symptoms, change of their symptoms, or with any other medical complaint.  The patient remained comfortable and stable during their visit in the ED.  Discharge and follow-up instructions discussed with the patient who expressed understanding and willingness to comply with my recommendations.    Other:   I have discussed this case with another health care provider.        Scribe Attestation:   Scribe #1: I performed the above scribed service and the documentation accurately describes the services I performed. I attest to the accuracy of the note.                      I, Perlita Clement, personally performed the services described in this documentation. All medical record entries made by the scribe were at my direction and in my presence.  I have reviewed the chart and agree that the record reflects my personal performance and is accurate and complete. Perlita Clement M.D. 6:13 PM12/16/2022    Clinical Impression:   Final diagnoses:  [M79.604, M79.605] Pain in both lower extremities (Primary)        ED Disposition Condition    Discharge Stable          ED Prescriptions       Medication Sig Dispense Start Date End Date Auth. Provider    acetaminophen (TYLENOL) 500 MG tablet Take 2 tablets (1,000 mg total) by mouth every 6 (six) hours as needed for Pain (leg pain). 112 tablet 12/16/2022 12/30/2022 Perlita Clement MD          Follow-up Information       Follow up With Specialties Details Why Contact Info Additional Information    [place] - Emergency Dept Emergency Medicine  As needed, If symptoms worsen [address] [facility] [id number] [phone]     Betsy Anna MD Internal Medicine Schedule an appointment as soon as possible for a visit   [address] [facility] [address]  [phone]       [facility] - Wound Care Wound Care Schedule an appointment as soon as possible for a visit   [address] [facility] [address]  [facility] [address] [phone] Please park in Lot C or D and use the Kristen Webb entrance. Check in at Medical Office Building [address].             Perlita Clement MD  12/16/22 8915

## 2022-12-16 NOTE — DISCHARGE INSTRUCTIONS
Thank you for choosing [facility]! We appreciate you trusting us with your medical care.     You can take 1000 mg Tylenol four times a day, and 600-800 mg Ibuprofen 4 times a day as needed for your leg pain     It is important to remember that some problems are difficult to diagnose and may not be found during your first visit. Be sure to follow up with your primary care doctor and review any labs/imaging that was performed during your visit with them. If you do not have a primary care doctor, you may contact the one listed on your discharge paperwork, or you may also call the [facility] Appointment Desk at [phone] to schedule an appointment.     All medications may potentially have side effects and it is impossible to predict which medications may give you side effects. If you feel that you are having a negative effect of any medication you should immediately stop taking them and seek medical attention.  Do not drive or make any important decisions for 24 hours if you have received any pain medications, sedatives or mood altering drugs during your ER visit.    We will be happy to take care of you for all of your future medical needs. You may return to the ER at any time for any new/concerning symptoms, worsening condition, or failure to improve. We hope you feel better soon.     Perlita Clement MD  Emergency Medicine Physician

## 2022-12-18 ENCOUNTER — HOSPITAL ENCOUNTER (EMERGENCY)
Facility: HOSPITAL | Age: 87
Discharge: HOME | End: 2022-12-18
Attending: EMERGENCY MEDICINE
Payer: MEDICARE

## 2022-12-18 VITALS
TEMPERATURE: 98 F | DIASTOLIC BLOOD PRESSURE: 55 MMHG | BODY MASS INDEX: 27.38 KG/M2 | OXYGEN SATURATION: 96 % | SYSTOLIC BLOOD PRESSURE: 116 MMHG | RESPIRATION RATE: 20 BRPM | HEIGHT: 63 IN | HEART RATE: 63 BPM

## 2022-12-18 DIAGNOSIS — M79.604 PAIN IN BOTH LOWER EXTREMITIES: Primary | ICD-10-CM

## 2022-12-18 DIAGNOSIS — M79.605 PAIN IN BOTH LOWER EXTREMITIES: Primary | ICD-10-CM

## 2022-12-18 LAB — POCT GLUCOSE: 123 MG/DL (ref 70–110)

## 2022-12-18 PROCEDURE — 82962 GLUCOSE BLOOD TEST: CPT

## 2022-12-18 PROCEDURE — 99283 EMERGENCY DEPT VISIT LOW MDM: CPT | Mod: 25

## 2022-12-18 PROCEDURE — 25000003 PHARM REV CODE 250: Performed by: EMERGENCY MEDICINE

## 2022-12-18 RX ORDER — IBUPROFEN 400 MG/1
800 TABLET ORAL
Status: COMPLETED | OUTPATIENT
Start: 2022-12-18 | End: 2022-12-18

## 2022-12-18 RX ORDER — TRAMADOL HYDROCHLORIDE 50 MG/1
50 TABLET ORAL EVERY 6 HOURS PRN
Qty: 12 TABLET | Refills: 0 | Status: SHIPPED | OUTPATIENT
Start: 2022-12-18 | End: 2022-12-28

## 2022-12-18 RX ORDER — TRAMADOL HYDROCHLORIDE 50 MG/1
50 TABLET ORAL EVERY 6 HOURS PRN
Qty: 12 TABLET | Refills: 0 | Status: SHIPPED | OUTPATIENT
Start: 2022-12-18 | End: 2022-12-18 | Stop reason: SDUPTHER

## 2022-12-18 RX ORDER — TRAMADOL HYDROCHLORIDE 50 MG/1
50 TABLET ORAL
Status: COMPLETED | OUTPATIENT
Start: 2022-12-18 | End: 2022-12-18

## 2022-12-18 RX ADMIN — TRAMADOL HYDROCHLORIDE 50 MG: 50 TABLET, COATED ORAL at 07:12

## 2022-12-18 RX ADMIN — IBUPROFEN 800 MG: 400 TABLET, FILM COATED ORAL at 09:12

## 2022-12-18 NOTE — DISCHARGE INSTRUCTIONS
Additional instructions  Followup with your primary care physician in 2-3 days if you are not improving. Take all your medications as prescribed. Return to the emergency department if you have increasing pain, chest pain, difficulty breathing,  nonstop vomiting, or any other concerns. Be sure to drink plenty of fluids to stay hydrated. Get plenty of rest. Please refer to additional educational material for further instructions.

## 2022-12-18 NOTE — ED NOTES
Patient received 50 mg PO tramadol for complaint of leg pain. Patient presents to ED from an independent living facility due to complaints of chronic bilateral lower leg pain. Patient has a hx of PAD and weeps wounds noted to lower legs, Dr. Monahan notified. Patient is AAOx4 and ambulates with walker @ baseline.

## 2022-12-18 NOTE — ED PROVIDER NOTES
Chief Complaint:  Bilateral leg pain    History of Present Illness:    Nas Valencia 89 y.o. with a  has a past medical history of Diabetes mellitus. who presents to the emergency department today with a complaint of bilateral leg pain.  Patient has known PID, known chronic wounds.  He reports that he has been unable to get any medications and so called EMS because his legs hurt.  He denies any injury or trauma.  He does report that he spends most of his time sitting or lying around at his apartment at [facility].  He does however get up and get around his apartment on his own using his walker.  He reports he has no difficulty getting to the bathroom.  No difficulty getting to the table.  Is provided 3 meals a day by [facility]. He did not take anything for the pain.  He reports that he has no one to get him medications and that is why he has not filled his prescription from his previous visit.  He denies any fever, chills or sweats.  No new trauma.     Pt has a caretaker Violette [phone] : He has home health 12 hours a day but lives independently. Receives wound care 2-3 times a week from a private nurse. Has motrin at home which she sees him take but he never takes Tylenol.         ROS    Constitutional: No fever, no chills.  Eyes: No discharge. No pain.  ENT: No nasal drainage. No ear ache. No sore throat.  Cardiovascular: No chest pain, no palpitations.  Respiratory: No cough, no shortness of breath.  Gastrointestinal: No abdominal pain, no vomiting. No diarrhea.  Genitourinary: No hematuria, dysuria, urgency.  Musculoskeletal: No back pain. + bilateral leg pain.   Skin: No rashes, no lesions.  Neurological: No headache, no focal weakness.  Hematologic: Does not bleed easily.     Otherwise remaining ROS negative     The history is provided by the patient      Reviewed and verified by myself:   PMH/PSH/SOC/FH REVIEWED :  Nas Valencia  has a past medical history of  Diabetes mellitus. and   has no past surgical history on file. with  reports that he quit smoking about 22 years ago. He has never used smokeless tobacco. He reports that he does not drink alcohol. and a family history is not on file.      Nursing/Ancillary staff note reviewed.  ALLERGIES REVIEWED  MEDICATIONS REVIEWED  VS reviewed         Physical Exam     ED Triage Vitals [12/18/22 0612]   /62   Pulse 64   Resp 18   Temp 97.7 °F (36.5 °C)   SpO2 100 %       Physical Exam    Constitutional: The patient is alert, very hard of hearing, has no immediate need for airway protection and no signs of toxicity. No acute distress. Lying in bed but able to sit up without difficulty.   Eyes: Pupils equal and round no pallor or injection. Extra ocular movements intact. No drainage.   ENT: Mucous membranes are moist. Oropharynx clear.   Neck: Neck is supple non-tender. No lymphadenopathy. No stridor.   Respiratory: There are no retractions, lungs are clear to auscultation. No wheezing, no crackles.   Cardiovascular: Regular rate and rhythm. No murmurs, rubs or gallops.  Chest/Breast: Chest wall nontender to palpation.   Gastrointestinal:  Abdomen is soft and non-tender, no masses, bowel sounds normal. No guarding, no rebound.  No pulsatile mass.   Neurological: Alert and oriented x 4. CN II-XII grossly intact. No focal weakness. Strength intact 5/5 bilaterally in upper and lower extremities.   Skin: Warm and dry, no rashes.  Musculoskeletal: Chronic skin changes with some chronic wounds.  No weeping.  They do not appear to be acutely cellulitic.  2+ DP pulses and have full range of motion. Back NTTP along the midline.         Labs Reviewed   POCT GLUCOSE - Abnormal; Notable for the following components:       Result Value    POCT Glucose 123 (*)     All other components within normal limits             ED Course     Medications   traMADoL tablet 50 mg (50 mg Oral Given 12/18/22 0726)   ibuprofen tablet 800 mg (800 mg Oral  Given 12/18/22 0942)         ED Course as of 12/19/22 0643   Sun Dec 18, 2022   0877 I spoke with his caretaker Violette who confirms that he does get home health and wound care.  I spoke with his wound care nurse Calli, she reports that she can  his prescription wherever I send it and get it to him. [JA]      ED Course User Index  [JA] Shaheen Monahan MD              Medical Decision Making:   History:   I obtained history from:  The patient  Old Medical Records: I decided to obtain old medical records.  The patient has had multiple visits for the same complaint in the last month.  Patient was admitted in May of 2022 for small-bowel obstruction, admitted an EJ.  pts PCP is Betsy Anna MD.        Differential Diagnosis:   DDX:  Chronic leg pain, PVD, PID, peripheral neuropathy, cellulitis, DVT      Clinical Tests:   Lab Tests: Ordered and Reviewed : Glucose normal.             ED Management:  Nas Valencia  with a past medical history of diabetes who presents to the emergency Department today with his chronic leg pain.  Patient has had multiple visits for this issue.  He has chronic skin changes that appear consistent with previous visits.  There is no sign of overlying infection at this point.  There is no significant edema.  Patient reports that he was unable to fill his prescription that he was given previously secondary to not having transportation.  Although he has a home health provider who comes in visits him he did not give the prescription to that person.  I have spoken with Calli who provides his wound care.  We have discussed where she would like the prescription sent to and she will pick them up today and bring them over to the patient.  The patient has both Amanda who is his wound care provider and Violette his caretakers numbers on his wall at his house and in his phone.  They have assured me that they have spoken to him multiple times about calling them 1st however  he seems to forget.  He does seem capable of taking care of his ADLs at this time, they are closely watching him for any change which would require change in his living situation.  At this time it is safe to send the patient home.  His pain is improved with Ultram.  This has been discussed multiple times with the patient but he does forget, his caretakers will be there when he arrives.  An unremarkable evaluation in the ED does not preclude the development or presence of a serious or life threatening condition. As such, patient was instructed to return immediately for any worsening or change in current symptoms. Precautions for return discussed at length.  Discharge and follow-up instructions discussed with the patient who expressed understanding and willingness to comply with my recommendations.    Voice recognition software utilized in this note.              Impression      The encounter diagnosis was Pain in both lower extremities.                Discharge Medication List as of 12/18/2022  8:46 AM           Follow-up Information       Schedule an appointment as soon as possible for a visit  with Betsy Anna MD.    Specialty: Internal Medicine  Contact information:  [address]  [facility] [address]  [phone]                                          Shaheen Monahan MD  12/19/22 0601

## 2022-12-18 NOTE — ED NOTES
Dr. Monahan reports speaking with patient's home health care nurse who states she does his wound care weekly @ home and will  patient's tramadol prescription today and deliver to patient at home. Discharge instructions and prescriptions reviewed with patient. Patient verbalizes understanding. VSS.

## 2022-12-18 NOTE — ED NOTES
Patient states he can not tolerate lying in the stretcher with his legs straight. Assissted to patient to seated walker x2, patient sitting in seated walker in room near nurses station with door open and call light in reach. Patient instructed not to get up or stand for without calling the nurse for assistance. Call light placed into patient's lap. Patient verbalizes understanding. Patient wearing non-slip shoes.

## 2022-12-18 NOTE — ED NOTES
Patient states his pain remains a 7/10 after receiving PO tramadol. Dr. Monahan notified via secure chat.

## 2022-12-25 ENCOUNTER — HOSPITAL ENCOUNTER (EMERGENCY)
Facility: HOSPITAL | Age: 87
Discharge: HOME | End: 2022-12-25
Attending: EMERGENCY MEDICINE
Payer: MEDICARE

## 2022-12-25 VITALS
TEMPERATURE: 98 F | BODY MASS INDEX: 27.29 KG/M2 | HEART RATE: 62 BPM | DIASTOLIC BLOOD PRESSURE: 49 MMHG | HEIGHT: 63 IN | SYSTOLIC BLOOD PRESSURE: 114 MMHG | OXYGEN SATURATION: 96 % | RESPIRATION RATE: 20 BRPM | WEIGHT: 154 LBS

## 2022-12-25 DIAGNOSIS — M79.605 LEG PAIN, ANTERIOR, LEFT: Primary | ICD-10-CM

## 2022-12-25 LAB — POCT GLUCOSE: 99 MG/DL (ref 70–110)

## 2022-12-25 PROCEDURE — 82962 GLUCOSE BLOOD TEST: CPT

## 2022-12-25 PROCEDURE — 99283 EMERGENCY DEPT VISIT LOW MDM: CPT

## 2022-12-25 PROCEDURE — 25000003 PHARM REV CODE 250: Performed by: EMERGENCY MEDICINE

## 2022-12-25 RX ORDER — IBUPROFEN 600 MG/1
600 TABLET ORAL
Status: COMPLETED | OUTPATIENT
Start: 2022-12-25 | End: 2022-12-25

## 2022-12-25 RX ADMIN — IBUPROFEN 600 MG: 600 TABLET, FILM COATED ORAL at 06:12

## 2022-12-26 NOTE — ED NOTES
"Pt prefers to sit in chair, door remains open for visualization, pt ambulatory with steady gait as long as holding on to something, states he gets around by "creeping," Dr. Leo at bedside for leg evaluation.  "

## 2022-12-26 NOTE — ED PROVIDER NOTES
Encounter Date: 2022       History     Chief Complaint   Patient presents with    Leg Pain     C/o chronic lt leg pain, pt from [facility]. Took ibuprophen pta.     HPI  89-year-old male with chronic left lower extremity pain presenting with left lower extremity pain  Patient is unaware why he is here, and when prompted, states that he was sent here  When asked what his emergent problem is, he states that he has leg pain that is always there and will not go away    Review of patient's allergies indicates:   Allergen Reactions    Gabapentin Hallucinations     Past Medical History:   Diagnosis Date    Diabetes mellitus      History reviewed. No pertinent surgical history.  History reviewed. No pertinent family history.  Social History     Tobacco Use    Smoking status: Former     Types: Cigarettes     Quit date: 2000     Years since quittin.6    Smokeless tobacco: Never   Substance Use Topics    Alcohol use: Never     Review of Systems   Constitutional:  Negative for fever.   HENT:  Negative for sore throat.    Respiratory:  Negative for shortness of breath.    Cardiovascular:  Negative for chest pain.   Gastrointestinal:  Negative for nausea.   Genitourinary:  Negative for dysuria.   Musculoskeletal:  Negative for back pain.   Skin:  Positive for wound. Negative for rash.   Neurological:  Negative for weakness.   Hematological:  Does not bruise/bleed easily.     Physical Exam     Initial Vitals [22 1752]   BP Pulse Resp Temp SpO2   112/68 67 20 97.9 °F (36.6 °C) 97 %      MAP       --         Physical Exam    Nursing note and vitals reviewed.  Constitutional:   Narrative: Triage vital signs reviewed.     Constitutional: Well-nourished, well-developed. Not in acute distress.  Kyphosis.     HEENT: Normocephalic, atraumatic. Moist mucous membranes.     Eyes: No conjunctival injection.     Resp: Normal respiratory effort, breathing unlabored.     Cardio: Regular rate and rhythm.     GI: Abdomen  "non-distended.      MSK:  Left lower extremity unwrapped with chronic appearing changes over the skin and healing wounds.  Covered in presumed calamine lotion.  No actively draining areas.  Granulation tissue forming.  No warmth.  Distal pulse intact.     Skin: Warm and dry. No rashes noted.     Neuro: Awake and alert. Moves all extremities.  Ambulating without assistance.             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  Spoke with the patient's caretaker, Violette, who stated that there were no new falls, and she did not believe he needed to come to the emergency department but he insisted anyway.  Here, patient is unable to elicit exactly why he is here but does complain of left lower extremity pain.  Unable to state what medications he is receiving, and from previous note, patient received medications from his caretakers.  Violette states that he gets ibuprofen as tramadol was "too strong" for him.  Today, his exam is consistent with healing wounds, no new open areas or signs of infection.  Patient is afebrile here.  Fingerstick 99.  The patient's wound was rewrapped with Xeroform and Ace bandage, patient is able to ambulate.  Safe to discharge back to his living facility.                        Clinical Impression:   Final diagnoses:  [M79.605] Leg pain, anterior, left (Primary)        ED Disposition Condition    Discharge Stable          ED Prescriptions    None       Follow-up Information       Follow up With Specialties Details Why Contact Info    Betsy Anna MD Internal Medicine   [address] [facility] [address]  [phone]               Vanita Leo MD  12/25/22 1911    "

## 2022-12-26 NOTE — DISCHARGE INSTRUCTIONS
You have no signs of infection today. You can take Tylenol 1 gram every 8 hours, and ibuprofen 800 mg every 8 hours; this means you can take pain medicine once every 4 hours.  Please see your doctor within the week to follow up on your visit to the ED. If you do not have a primary doctor, call the number below to find one.     [facility] Internal Medicine  [phone]  [address], [facility] [[email or web address]] [address]   [facility] [id number]